# Patient Record
Sex: MALE | Race: WHITE | NOT HISPANIC OR LATINO | Employment: OTHER | URBAN - NONMETROPOLITAN AREA
[De-identification: names, ages, dates, MRNs, and addresses within clinical notes are randomized per-mention and may not be internally consistent; named-entity substitution may affect disease eponyms.]

---

## 2019-01-24 ENCOUNTER — HISTORICAL (OUTPATIENT)
Dept: ADMINISTRATIVE | Facility: HOSPITAL | Age: 79
End: 2019-01-24

## 2020-06-07 ENCOUNTER — HISTORICAL (OUTPATIENT)
Dept: ADMINISTRATIVE | Facility: HOSPITAL | Age: 80
End: 2020-06-07

## 2020-06-08 ENCOUNTER — HISTORICAL (OUTPATIENT)
Dept: ADMINISTRATIVE | Facility: HOSPITAL | Age: 80
End: 2020-06-08

## 2021-02-25 LAB
BASOPHILS # BLD AUTO: 0.03 X10(3)/MCL (ref 0.01–0.08)
BASOPHILS NFR BLD AUTO: 0.5 % (ref 0.1–1.2)
EOSINOPHIL # BLD AUTO: 0.15 X10(3)/MCL (ref 0.04–0.54)
EOSINOPHIL NFR BLD AUTO: 2.3 % (ref 0.7–7)
ERYTHROCYTE [DISTWIDTH] IN BLOOD BY AUTOMATED COUNT: 14.9 % (ref 11.6–14.4)
HCT VFR BLD AUTO: 40.6 % (ref 36–52)
HGB BLD-MCNC: 12.8 G/DL (ref 13–18)
IMM GRANULOCYTES # BLD AUTO: 0.07 X10E3/UL (ref 0–0.03)
IMM GRANULOCYTES NFR BLD AUTO: 1.1 % (ref 0–0.5)
LYMPHOCYTES # BLD AUTO: 0.95 X10(3)/MCL (ref 1.32–3.57)
LYMPHOCYTES NFR BLD AUTO: 14.3 % (ref 20–55)
MCH RBC QN AUTO: 26.6 PG (ref 27–34)
MCHC RBC AUTO-ENTMCNC: 31.5 G/DL (ref 31–37)
MCV RBC AUTO: 84.2 FL (ref 79–99)
MONOCYTES # BLD AUTO: 0.85 X10(3)/MCL (ref 0.3–0.82)
MONOCYTES NFR BLD AUTO: 12.8 % (ref 4.7–12.5)
NEUTROPHILS # BLD AUTO: 4.6 X10(3)/MCL (ref 1.78–5.38)
NEUTROPHILS NFR BLD AUTO: 69 % (ref 37–73)
PLATELET # BLD AUTO: 333 X10(3)/MCL (ref 140–371)
PMV BLD AUTO: 9.7 FL (ref 9.4–12.4)
RBC # BLD AUTO: 4.82 X10(6)/MCL (ref 4–6)
WBC # SPEC AUTO: 6.7 X10(3)/MCL (ref 4–11.5)

## 2021-06-21 LAB
ALBUMIN SERPL-MCNC: 3.8 G/DL (ref 3.4–5)
ALBUMIN/GLOB SERPL: 1.6 {RATIO}
ALP SERPL-CCNC: 98 U/L (ref 50–144)
ALT SERPL-CCNC: 43 U/L (ref 1–45)
ANION GAP SERPL CALC-SCNC: 6 MMOL/L (ref 7–16)
AST SERPL-CCNC: 74 U/L (ref 17–59)
BASOPHILS # BLD AUTO: 0.01 X10(3)/MCL (ref 0.01–0.08)
BASOPHILS NFR BLD AUTO: 0.1 % (ref 0.1–1.2)
BILIRUB SERPL-MCNC: 1.41 MG/DL (ref 0.1–1)
BUN SERPL-MCNC: 17 MG/DL (ref 7–20)
CALCIUM SERPL-MCNC: 9.8 MG/DL (ref 8.4–10.2)
CHLORIDE SERPL-SCNC: 106 MMOL/L (ref 94–110)
CHOLEST SERPL-MCNC: 173 MG/DL (ref 0–200)
CO2 SERPL-SCNC: 27 MMOL/L (ref 21–32)
CREAT SERPL-MCNC: 1 MG/DL (ref 0.66–1.25)
CREAT/UREA NIT SERPL: 17 (ref 12–20)
EOSINOPHIL # BLD AUTO: 0.07 X10(3)/MCL (ref 0.04–0.54)
EOSINOPHIL NFR BLD AUTO: 0.9 % (ref 0.7–7)
ERYTHROCYTE [DISTWIDTH] IN BLOOD BY AUTOMATED COUNT: 14.9 % (ref 11.6–14.4)
EST. AVERAGE GLUCOSE BLD GHB EST-MCNC: 108 MG/DL (ref 70–115)
GLOBULIN SER-MCNC: 2.4 G/DL (ref 2–3.9)
GLUCOSE SERPL-MCNC: 108 MGM./DL (ref 70–115)
HBA1C MFR BLD: 5.6 % (ref 4–6)
HCT VFR BLD AUTO: 42.7 % (ref 36–52)
HDLC SERPL-MCNC: 82 MG/DL (ref 40–60)
HGB BLD-MCNC: 13.4 G/DL (ref 13–18)
IMM GRANULOCYTES # BLD AUTO: 0.02 X10E3/UL (ref 0–0.03)
IMM GRANULOCYTES NFR BLD AUTO: 0.2 % (ref 0–0.5)
LDLC SERPL CALC-MCNC: 54.5 MG/DL (ref 30–100)
LYMPHOCYTES # BLD AUTO: 0.78 X10(3)/MCL (ref 1.32–3.57)
LYMPHOCYTES NFR BLD AUTO: 9.6 % (ref 20–55)
MCH RBC QN AUTO: 26.7 PG (ref 27–34)
MCHC RBC AUTO-ENTMCNC: 31.4 G/DL (ref 31–37)
MCV RBC AUTO: 85.2 FL (ref 79–99)
MONOCYTES # BLD AUTO: 0.67 X10(3)/MCL (ref 0.3–0.82)
MONOCYTES NFR BLD AUTO: 8.2 % (ref 4.7–12.5)
NEUTROPHILS # BLD AUTO: 6.59 X10(3)/MCL (ref 1.78–5.38)
NEUTROPHILS NFR BLD AUTO: 81 % (ref 37–73)
PLATELET # BLD AUTO: 321 X10(3)/MCL (ref 140–371)
PMV BLD AUTO: 9.5 FL (ref 9.4–12.4)
POTASSIUM SERPL-SCNC: 4.8 MMOL/L (ref 3.5–5.1)
PROT SERPL-MCNC: 6.2 G/DL (ref 6.3–8.2)
RBC # BLD AUTO: 5.01 X10(6)/MCL (ref 4–6)
SODIUM SERPL-SCNC: 139 MMOL/L (ref 135–145)
TRIGL SERPL-MCNC: 88 MG/DL (ref 30–200)
TSH SERPL-ACNC: 1.68 UIU/ML (ref 0.36–3.74)
WBC # SPEC AUTO: 8.1 X10(3)/MCL (ref 4–11.5)

## 2021-09-30 LAB
ALBUMIN SERPL-MCNC: 4.1 G/DL (ref 3.4–5)
ALBUMIN/GLOB SERPL: 1.7 {RATIO}
ALP SERPL-CCNC: 83 U/L (ref 50–144)
ALT SERPL-CCNC: 23 U/L (ref 1–45)
ANION GAP SERPL CALC-SCNC: 10 MMOL/L (ref 7–16)
AST SERPL-CCNC: 39 U/L (ref 17–59)
BASOPHILS # BLD AUTO: 0.03 X10(3)/MCL (ref 0.01–0.08)
BASOPHILS NFR BLD AUTO: 0.5 % (ref 0.1–1.2)
BILIRUB SERPL-MCNC: 0.54 MG/DL (ref 0.1–1)
BUN SERPL-MCNC: 20 MG/DL (ref 7–20)
CALCIUM SERPL-MCNC: 9.4 MG/DL (ref 8.4–10.2)
CHLORIDE SERPL-SCNC: 105 MMOL/L (ref 94–110)
CO2 SERPL-SCNC: 25 MMOL/L (ref 21–32)
CREAT SERPL-MCNC: 1.07 MG/DL (ref 0.66–1.25)
CREAT/UREA NIT SERPL: 18.7 (ref 12–20)
EOSINOPHIL # BLD AUTO: 0.29 X10(3)/MCL (ref 0.04–0.54)
EOSINOPHIL NFR BLD AUTO: 5.3 % (ref 0.7–7)
ERYTHROCYTE [DISTWIDTH] IN BLOOD BY AUTOMATED COUNT: 14.7 % (ref 11.6–14.4)
EST. AVERAGE GLUCOSE BLD GHB EST-MCNC: 108 MG/DL (ref 70–115)
GLOBULIN SER-MCNC: 2.4 G/DL (ref 2–3.9)
GLUCOSE SERPL-MCNC: 90 MGM./DL (ref 70–115)
HBA1C MFR BLD: 5.6 % (ref 4–6)
HCT VFR BLD AUTO: 38.6 % (ref 36–52)
HGB BLD-MCNC: 12.8 G/DL (ref 13–18)
IMM GRANULOCYTES # BLD AUTO: 0.03 X10E3/UL (ref 0–0.03)
IMM GRANULOCYTES NFR BLD AUTO: 0.5 % (ref 0–0.5)
LYMPHOCYTES # BLD AUTO: 0.86 X10(3)/MCL (ref 1.32–3.57)
LYMPHOCYTES NFR BLD AUTO: 15.7 % (ref 20–55)
MCH RBC QN AUTO: 27.8 PG (ref 27–34)
MCHC RBC AUTO-ENTMCNC: 33.2 G/DL (ref 31–37)
MCV RBC AUTO: 83.7 FL (ref 79–99)
MONOCYTES # BLD AUTO: 0.46 X10(3)/MCL (ref 0.3–0.82)
MONOCYTES NFR BLD AUTO: 8.4 % (ref 4.7–12.5)
NEUTROPHILS # BLD AUTO: 3.81 X10(3)/MCL (ref 1.78–5.38)
NEUTROPHILS NFR BLD AUTO: 69.6 % (ref 37–73)
PLATELET # BLD AUTO: 314 X10(3)/MCL (ref 140–371)
PMV BLD AUTO: 9.4 FL (ref 9.4–12.4)
POTASSIUM SERPL-SCNC: 4.6 MMOL/L (ref 3.5–5.1)
PROT SERPL-MCNC: 6.5 G/DL (ref 6.3–8.2)
RBC # BLD AUTO: 4.61 X10(6)/MCL (ref 4–6)
SODIUM SERPL-SCNC: 140 MMOL/L (ref 135–145)
WBC # SPEC AUTO: 5.5 X10(3)/MCL (ref 4–11.5)

## 2022-04-05 LAB
AGE: 81
ALBUMIN SERPL-MCNC: 4.1 G/DL (ref 3.4–5)
ALBUMIN/GLOB SERPL: 1.6 {RATIO}
ALP SERPL-CCNC: 87 U/L (ref 50–144)
ALT SERPL-CCNC: 15 U/L (ref 1–45)
ANION GAP SERPL CALC-SCNC: 7 MMOL/L (ref 2–13)
AST SERPL-CCNC: 35 U/L (ref 17–59)
BASOPHILS # BLD AUTO: 0.03 10*3/UL (ref 0.01–0.08)
BASOPHILS NFR BLD AUTO: 0.5 % (ref 0.1–1.2)
BILIRUB SERPL-MCNC: 0.7 MG/DL (ref 0–1)
BUN SERPL-MCNC: 26 MG/DL (ref 7–20)
CALCIUM SERPL-MCNC: 8.9 MG/DL (ref 8.4–10.2)
CHLORIDE SERPL-SCNC: 105 MMOL/L (ref 94–110)
CHOLEST SERPL-MCNC: 176 MG/DL (ref 0–200)
CO2 SERPL-SCNC: 28 MMOL/L (ref 21–32)
CREAT SERPL-MCNC: 1.04 MG/DL (ref 0.66–1.25)
CREAT/UREA NIT SERPL: 25 (ref 12–20)
EOSINOPHIL # BLD AUTO: 0.17 10*3/UL (ref 0.04–0.54)
EOSINOPHIL NFR BLD AUTO: 2.8 % (ref 0.7–7)
ERYTHROCYTE [DISTWIDTH] IN BLOOD BY AUTOMATED COUNT: 14.2 % (ref 11.6–14.4)
EST. AVERAGE GLUCOSE BLD GHB EST-MCNC: 102 MG/DL (ref 70–115)
GLOBULIN SER-MCNC: 2.5 G/DL (ref 2–3.9)
GLUCOSE SERPL-MCNC: 100 MG/DL (ref 70–115)
HBA1C MFR BLD: 5.4 % (ref 4–6)
HCT VFR BLD AUTO: 39.5 % (ref 36–52)
HDLC SERPL-MCNC: 69 MG/DL (ref 40–60)
HGB BLD-MCNC: 12.8 G/DL (ref 13–18)
IMM GRANULOCYTES # BLD AUTO: 0.03 10*3/UL (ref 0–0.03)
IMM GRANULOCYTES NFR BLD AUTO: 0.5 % (ref 0–0.5)
LDLC SERPL CALC-MCNC: 62.7 MG/DL (ref 30–100)
LYMPHOCYTES # BLD AUTO: 1.2 10*3/UL (ref 1.32–3.57)
LYMPHOCYTES NFR BLD AUTO: 19.9 % (ref 20–55)
MANUAL DIFF? (OHS): NORMAL
MCH RBC QN AUTO: 27.6 PG (ref 27–34)
MCHC RBC AUTO-ENTMCNC: 32.4 G/DL (ref 31–37)
MCV RBC AUTO: 85.1 FL (ref 79–99)
MONOCYTES # BLD AUTO: 0.54 10*3/UL (ref 0.3–0.82)
MONOCYTES NFR BLD AUTO: 8.9 % (ref 4.7–12.5)
NEUTROPHILS # BLD AUTO: 4.07 10*3/UL (ref 1.78–5.38)
NEUTROPHILS NFR BLD AUTO: 67.4 % (ref 37–73)
PLATELET # BLD AUTO: 331 10*3/UL (ref 140–371)
PMV BLD AUTO: 9.9 FL (ref 9.4–12.4)
POTASSIUM SERPL-SCNC: 4.5 MMOL/L (ref 3.5–5.1)
PROT SERPL-MCNC: 6.6 G/DL (ref 6.3–8.2)
RBC # BLD AUTO: 4.64 10*6/UL (ref 4–6)
SODIUM SERPL-SCNC: 140 MMOL/L (ref 135–145)
TRIGL SERPL-MCNC: 120 MG/DL (ref 30–200)
TSH SERPL-ACNC: 2.47 M[IU]/L (ref 0.36–3.74)
WBC # SPEC AUTO: 6 10*3/UL (ref 4–11.5)

## 2022-04-10 ENCOUNTER — HISTORICAL (OUTPATIENT)
Dept: ADMINISTRATIVE | Facility: HOSPITAL | Age: 82
End: 2022-04-10

## 2022-04-27 VITALS
HEIGHT: 71 IN | BODY MASS INDEX: 20.55 KG/M2 | SYSTOLIC BLOOD PRESSURE: 118 MMHG | DIASTOLIC BLOOD PRESSURE: 62 MMHG | OXYGEN SATURATION: 97 % | WEIGHT: 146.81 LBS

## 2022-04-30 ENCOUNTER — HISTORICAL (OUTPATIENT)
Dept: ADMINISTRATIVE | Facility: HOSPITAL | Age: 82
End: 2022-04-30

## 2022-05-03 NOTE — HISTORICAL [facility]ERNER
This is a historical note converted from Pee. Formatting and pictures may have been removed.  Please reference Pee for original formatting and attached multimedia. History of Present Illness  80-year-old male?asked to be seen today because of some chest pain. ?He has a history of known coronary disease. ?He has been feeling heaviness on his chest?especially when he exerts himself. ?He did take some Nitrostat and it does seem to help.? He had a little bit of increased dyspnea on exertion as well.  Review of Systems  See HPI  Physical Exam  Heart with a regular rate and rhythm  Assessment/Plan  1.?CAD in native artery ? I25.10  ?He has no unstable angina so I want him to continue his Nitrostat as needed. ?We will check a CBC today to make sure that he is not having any anemia?as a trigger for this as he had been stable for the last 6 months.? He does have an appoint with his cardiologist?next week and I told him to keep this and let them know.  Orders:  Office/Outpatient Visit Level 3 Established 26017 , CAD in native artery, FREDERICK Pardo [facility], 02/25/21 17:13:00 CST   Problem List/Past Medical History  Ongoing  CAD in native artery  Depression  Disc disease, degenerative, cervical  History of pancreatic cancer  HTN (hypertension)  Hyperlipidemia  IFG (impaired fasting glucose)  Lumbar spinal stenosis  Medicare annual wellness visit, subsequent  Protein deficiency  Right supraspinatus tendinitis  Shoulder bursitis  Historical  No qualifying data  Procedure/Surgical History  Colonoscopy (07/15/2020)  EGD - Esophagogastroduodenoscopy (07/15/2020)   Medications  aspirin 81 mg oral Delayed Release (EC) tablet, 81 mg= 1 tab(s), Oral, Daily  atorvastatin 40 mg oral tablet, See Instructions  ClearLax oral powder for reconstitution, 17 gm, Oral, Daily  clopidogrel 75 mg oral tablet, 75 mg= 1 tab(s), Oral, Daily  Creon 12,000 units oral delayed release capsule, 1 cap(s), Oral, TID  fenofibrate 134 mg oral  capsule, See Instructions  isosorbide MONOnitrate 30 mg oral tablet, Extended Release, See Instructions  metoprolol succinate 100 mg oral capsule, extended release, 100 mg= 1 cap(s), Oral, Daily  miSOPROStol 100 mcg oral tablet, 100 mcg= 1 tab(s), Oral, BID  nitroglycerin 0.4 mg sublingual spray, SL, q5min  Pantoprazole 40 mg ORAL EC-Tablet, 40 mg= 1 tab(s), Oral, Daily  Tylenol, Oral  venlafaxine 150 mg oral capsule, extended release, See Instructions  Vitamin D 50,000 intl units oral capsule, 45485 IntUnit= 1 cap(s), Oral, 2x/Wk, 1 refills  zolpidem 10 mg oral tablet, 10 mg= 1 tab(s), Oral, qPM, 5 refills  Allergies  nitroglycerin?(Migraine)  Social History  Abuse/Neglect  No, 03/04/2020  Tobacco  Never (less than 100 in lifetime), No, 03/04/2020  Family History  Family history is negative  Immunizations  Vaccine Date Status   COVID-19 MRNA, LNP-S, PF- Pfizer 02/05/2021 Given   COVID-19 MRNA, LNP-S, PF- Pfizer 01/15/2021 Given   Health Maintenance  Health Maintenance  ???Pending?(in the next year)  ??? ??OverDue  ??? ? ? ?Influenza Vaccine due??10/01/20??and every 1??day(s)  ??? ? ? ?Advance Directive due??01/02/21??and every 1??year(s)  ??? ? ? ?Cognitive Screening due??01/02/21??and every 1??year(s)  ??? ? ? ?Fall Risk Assessment due??01/02/21??and every 1??year(s)  ??? ? ? ?Functional Assessment due??01/02/21??and every 1??year(s)  ??? ??Due?  ??? ? ? ?Obesity Screening due??01/01/21??and every 1??year(s)  ??? ? ? ?ADL Screening due??02/25/21??and every 1??year(s)  ??? ? ? ?Hypertension Management-Education due??02/25/21??and every 1??year(s)  ??? ? ? ?Pneumococcal Vaccine due??02/25/21??Unknown Frequency  ??? ? ? ?Tetanus Vaccine due??02/25/21??and every 10??year(s)  ??? ? ? ?Zoster Vaccine due??02/25/21??Unknown Frequency  ??? ??Due In Future?  ??? ? ? ?Medicare Annual Wellness Exam not due until??09/03/21??and every 1??year(s)  ??? ? ? ?Hypertension Management-BMP not due until??12/08/21??and every  1??year(s)  ??? ? ? ?Blood Pressure Screening not due until??12/15/21??and every 1??year(s)  ??? ? ? ?Body Mass Index Check not due until??12/15/21??and every 1??year(s)  ??? ? ? ?Hypertension Management-Blood Pressure not due until??12/15/21??and every 1??year(s)  ???Satisfied?(in the past 1 year)  ??? ??Satisfied?  ??? ? ? ?Advance Directive on??09/03/20.??Satisfied by Disha Fritz  ??? ? ? ?Blood Pressure Screening on??12/15/20.??Satisfied by Dee Carbone  ??? ? ? ?Body Mass Index Check on??12/15/20.??Satisfied by Dee Carbone  ??? ? ? ?Cognitive Screening on??09/03/20.??Satisfied by Disha Fritz  ??? ? ? ?Coronary Artery Disease Maintenance-Lipid Lowering Therapy on??02/23/21.??Satisfied by Toni Grigsby MD  ??? ? ? ?Depression Screening on??09/03/20.??Satisfied by Disha Fritz  ??? ? ? ?Fall Risk Assessment on??09/03/20.??Satisfied by Disha Fritz  ??? ? ? ?Functional Assessment on??09/03/20.??Satisfied by Disha Fritz  ??? ? ? ?Hypertension Management-Blood Pressure on??12/15/20.??Satisfied by Dee Carbone  ??? ? ? ?Medicare Annual Wellness Exam on??09/03/20.??Satisfied by Toni Grigsby MD  ??? ? ? ?Obesity Screening on??12/15/20.??Satisfied by Dee Carbone  ?

## 2023-02-09 DIAGNOSIS — G47.00 INSOMNIA, UNSPECIFIED TYPE: Primary | ICD-10-CM

## 2023-02-09 RX ORDER — ZOLPIDEM TARTRATE 10 MG/1
TABLET ORAL
COMMUNITY
Start: 2022-11-11 | End: 2023-02-09 | Stop reason: SDUPTHER

## 2023-02-09 RX ORDER — ZOLPIDEM TARTRATE 10 MG/1
10 TABLET ORAL NIGHTLY
Qty: 30 TABLET | Refills: 0 | Status: SHIPPED | OUTPATIENT
Start: 2023-02-09 | End: 2023-03-15 | Stop reason: SDUPTHER

## 2023-02-09 NOTE — TELEPHONE ENCOUNTER
----- Message from Jeniffer Webber sent at 2/9/2023  9:45 AM CST -----  Regarding: Refill  Pt called requesting a refill for his  zolpidem 10 mg oral tablet to be sent to the [address] Phone [phone]

## 2023-03-15 DIAGNOSIS — G47.00 INSOMNIA, UNSPECIFIED TYPE: ICD-10-CM

## 2023-03-16 RX ORDER — ZOLPIDEM TARTRATE 10 MG/1
10 TABLET ORAL NIGHTLY
Qty: 90 TABLET | Refills: 0 | Status: SHIPPED | OUTPATIENT
Start: 2023-03-16 | End: 2023-06-13

## 2023-04-12 PROCEDURE — 83036 HEMOGLOBIN GLYCOSYLATED A1C: CPT | Performed by: FAMILY MEDICINE

## 2023-04-12 PROCEDURE — 80061 LIPID PANEL: CPT | Performed by: FAMILY MEDICINE

## 2023-04-12 PROCEDURE — 85025 COMPLETE CBC W/AUTO DIFF WBC: CPT | Performed by: FAMILY MEDICINE

## 2023-04-12 PROCEDURE — 80053 COMPREHEN METABOLIC PANEL: CPT | Performed by: FAMILY MEDICINE

## 2023-04-19 ENCOUNTER — OFFICE VISIT (OUTPATIENT)
Dept: FAMILY MEDICINE | Facility: CLINIC | Age: 83
End: 2023-04-19
Payer: MEDICARE

## 2023-04-19 VITALS
OXYGEN SATURATION: 100 % | WEIGHT: 155 LBS | HEIGHT: 71 IN | BODY MASS INDEX: 21.7 KG/M2 | HEART RATE: 62 BPM | DIASTOLIC BLOOD PRESSURE: 70 MMHG | SYSTOLIC BLOOD PRESSURE: 132 MMHG

## 2023-04-19 DIAGNOSIS — Z90.410 HISTORY OF WHIPPLE PROCEDURE: ICD-10-CM

## 2023-04-19 DIAGNOSIS — I10 PRIMARY HYPERTENSION: ICD-10-CM

## 2023-04-19 DIAGNOSIS — M48.061 SPINAL STENOSIS OF LUMBAR REGION WITHOUT NEUROGENIC CLAUDICATION: ICD-10-CM

## 2023-04-19 DIAGNOSIS — I25.10 CORONARY ARTERY DISEASE INVOLVING NATIVE CORONARY ARTERY OF NATIVE HEART, UNSPECIFIED WHETHER ANGINA PRESENT: ICD-10-CM

## 2023-04-19 DIAGNOSIS — Z90.49 HISTORY OF WHIPPLE PROCEDURE: ICD-10-CM

## 2023-04-19 DIAGNOSIS — E78.5 HYPERLIPIDEMIA, UNSPECIFIED HYPERLIPIDEMIA TYPE: ICD-10-CM

## 2023-04-19 DIAGNOSIS — E46: ICD-10-CM

## 2023-04-19 DIAGNOSIS — R74.8 ELEVATED ALKALINE PHOSPHATASE LEVEL: Primary | ICD-10-CM

## 2023-04-19 DIAGNOSIS — R73.01 IMPAIRED FASTING GLUCOSE: ICD-10-CM

## 2023-04-19 DIAGNOSIS — M50.30 DEGENERATION OF INTERVERTEBRAL DISC OF CERVICAL REGION: ICD-10-CM

## 2023-04-19 DIAGNOSIS — F32.A DEPRESSIVE DISORDER: ICD-10-CM

## 2023-04-19 PROCEDURE — 3075F SYST BP GE 130 - 139MM HG: CPT | Mod: ,,, | Performed by: FAMILY MEDICINE

## 2023-04-19 PROCEDURE — 1159F MED LIST DOCD IN RCRD: CPT | Mod: ,,, | Performed by: FAMILY MEDICINE

## 2023-04-19 PROCEDURE — 99214 PR OFFICE/OUTPT VISIT, EST, LEVL IV, 30-39 MIN: ICD-10-PCS | Mod: ,,, | Performed by: FAMILY MEDICINE

## 2023-04-19 PROCEDURE — 99214 OFFICE O/P EST MOD 30 MIN: CPT | Mod: ,,, | Performed by: FAMILY MEDICINE

## 2023-04-19 PROCEDURE — 3075F PR MOST RECENT SYSTOLIC BLOOD PRESS GE 130-139MM HG: ICD-10-PCS | Mod: ,,, | Performed by: FAMILY MEDICINE

## 2023-04-19 PROCEDURE — 3078F PR MOST RECENT DIASTOLIC BLOOD PRESSURE < 80 MM HG: ICD-10-PCS | Mod: ,,, | Performed by: FAMILY MEDICINE

## 2023-04-19 PROCEDURE — 1159F PR MEDICATION LIST DOCUMENTED IN MEDICAL RECORD: ICD-10-PCS | Mod: ,,, | Performed by: FAMILY MEDICINE

## 2023-04-19 PROCEDURE — 3078F DIAST BP <80 MM HG: CPT | Mod: ,,, | Performed by: FAMILY MEDICINE

## 2023-04-19 RX ORDER — FLECAINIDE ACETATE 50 MG/1
1 TABLET ORAL DAILY
COMMUNITY
Start: 2023-03-06

## 2023-04-19 RX ORDER — METOPROLOL SUCCINATE 50 MG/1
1 TABLET, EXTENDED RELEASE ORAL DAILY
COMMUNITY
Start: 2023-04-14

## 2023-04-19 RX ORDER — METHOCARBAMOL 750 MG/1
750 TABLET, FILM COATED ORAL
COMMUNITY
End: 2023-05-31

## 2023-04-19 RX ORDER — VENLAFAXINE HYDROCHLORIDE 150 MG/1
1 CAPSULE, EXTENDED RELEASE ORAL DAILY
COMMUNITY
Start: 2023-04-14 | End: 2023-06-13

## 2023-04-19 RX ORDER — AMLODIPINE BESYLATE 5 MG/1
1 TABLET ORAL DAILY
COMMUNITY
Start: 2023-04-14

## 2023-04-19 RX ORDER — ATORVASTATIN CALCIUM 40 MG/1
1 TABLET, FILM COATED ORAL DAILY
COMMUNITY
Start: 2023-02-27 | End: 2023-05-24

## 2023-04-19 RX ORDER — NAPROXEN SODIUM 220 MG/1
81 TABLET, FILM COATED ORAL DAILY
COMMUNITY

## 2023-04-19 RX ORDER — ERGOCALCIFEROL 1.25 MG/1
50000 CAPSULE ORAL
COMMUNITY
Start: 2023-04-14

## 2023-04-19 RX ORDER — PANCRELIPASE 60000; 12000; 38000 [USP'U]/1; [USP'U]/1; [USP'U]/1
3 CAPSULE, DELAYED RELEASE PELLETS ORAL 3 TIMES DAILY
COMMUNITY
Start: 2022-10-19 | End: 2023-05-31

## 2023-04-19 RX ORDER — NITROGLYCERIN 400 UG/1
SPRAY ORAL
COMMUNITY
Start: 2023-01-31

## 2023-04-19 RX ORDER — PANTOPRAZOLE SODIUM 40 MG/1
1 TABLET, DELAYED RELEASE ORAL DAILY
COMMUNITY
Start: 2023-02-27 | End: 2023-11-01

## 2023-04-19 NOTE — PROGRESS NOTES
SUBJECTIVE:  Alissa Najera is a 82 y.o. male here for Follow-up (Lab results)      HPI  Here for follow-up on chronic medical conditions.  See assessment plan for individual list of issues.  He has been doing pretty well other than a few symptoms.  He has been having some early satiety in the evening and not eating as much.  He has not had any weight loss.  He has been having some low back pain into his hips when he walks.  Augustuss allergies, medications, history, and problem list were updated as appropriate.    Review of Systems   See HPI.    Recent Results (from the past 504 hour(s))   Comprehensive Metabolic Panel    Collection Time: 04/12/23  9:24 AM   Result Value Ref Range    Sodium Level 141 135 - 145 mmol/L    Potassium Level 4.9 3.5 - 5.1 mmol/L    Chloride 107 98 - 110 mmol/L    Carbon Dioxide 29 21 - 32 mmol/L    Glucose Level 110 70 - 115 mg/dL    Blood Urea Nitrogen 20.0 7.0 - 20.0 mg/dL    Creatinine 0.77 0.66 - 1.25 mg/dL    Calcium Level Total 9.4 8.4 - 10.2 mg/dL    Protein Total 6.7 6.3 - 8.2 gm/dL    Albumin Level 4.2 3.4 - 5.0 g/dL    Globulin 2.5 2.0 - 3.9 gm/dL    Albumin/Globulin Ratio 1.7 ratio    Bilirubin Total 0.6 0.0 - 1.0 mg/dL    Alkaline Phosphatase 162 (H) 50 - 144 unit/L    Alanine Aminotransferase 42 1 - 45 unit/L    Aspartate Aminotransferase 46 17 - 59 unit/L    eGFR 89 mls/min/1.73/m2    Anion Gap 5.0 2.0 - 13.0 mEq/L    BUN/Creatinine Ratio 26 (H) 12 - 20   Hemoglobin A1C    Collection Time: 04/12/23  9:24 AM   Result Value Ref Range    Hemoglobin A1c 5.8 4.0 - 6.0 %    Estimated Average Glucose 119.8 (H) 70.0 - 115.0 mg/dL   Lipid Panel    Collection Time: 04/12/23  9:24 AM   Result Value Ref Range    Cholesterol Total 170 0 - 200 mg/dL    HDL Cholesterol 54 40 - 60 mg/dL    Triglyceride 187 30 - 200 mg/dL    LDL Cholesterol Direct 73.9 30.0 - 100.0 mg/dL   CBC with Differential    Collection Time: 04/12/23  9:24 AM   Result Value Ref Range    WBC 6.0 4.0 - 11.5 x10(3)/mcL  "   RBC 5.11 4.00 - 6.00 x10(6)/mcL    Hgb 14.2 13.0 - 18.0 g/dL    Hct 42.8 36.0 - 52.0 %    MCV 83.8 79.0 - 99.0 fL    MCH 27.8 27.0 - 34.0 pg    MCHC 33.2 31.0 - 37.0 g/dL    RDW 13.8 11.6 - 14.4 %    Platelet 264 140 - 371 x10(3)/mcL    MPV 9.7 9.4 - 12.4 fL    Neut % 73.3 (H) 37 - 73 %    Lymph % 16.3 (L) 20 - 55 %    Mono % 7.9 4.7 - 12.5 %    Eos % 1.5 0.7 - 7 %    Basophil % 0.5 0.1 - 1.2 %    Lymph # 0.97 (L) 1.32 - 3.57 x10(3)/mcL    Neut # 4.37 1.78 - 5.38 x10(3)/mcL    Mono # 0.47 0.3 - 0.82 x10(3)/mcL    Eos # 0.09 0.04 - 0.54 x10(3)/mcL    Baso # 0.03 0.01 - 0.08 x10(3)/mcL    IG# 0.03 0.0001 - 0.031 x10(3)/mcL    IG% 0.5 0 - 0.5 %    NRBC% 0.0 0 - 1 %       OBJECTIVE:  Vital signs  Vitals:    04/19/23 1120 04/19/23 1124   BP: (!) 146/68 132/70   BP Location: Right arm Right arm   Patient Position: Sitting Sitting   Pulse: 62    SpO2: 100%    Weight: 70.3 kg (155 lb)    Height: 5' 10.87" (1.8 m)         Physical Exam abdomen has some tenderness in the epigastric area but no masses noted    ASSESSMENT/PLAN:  1. Spinal stenosis of lumbar region without neurogenic claudication  I believe this is what is causing his low back and pain in the his sacral area.    2. Degeneration of intervertebral disc of cervical region      3. Depressive disorder  Continue venlafaxine    4. Primary hypertension  Blood pressure well controlled on metoprolol and amlodipine    5. Hyperlipidemia, unspecified hyperlipidemia type  On atorvastatin 40 mg daily.  LDL is 73    6. Impaired fasting glucose  Glucose is 110, A1c 5.8    7. Protein deficiency disease  Protein this time was 6.7.  He is still taking protein drinks 3 times a week and eating cottage cheese to help with routine    8. History of Whipple procedure  He is on pancreatic enzymes    9. Coronary artery disease involving native coronary artery of native heart, unspecified whether angina present  No current angina    10. Elevated alkaline phosphatase level  Alkaline " phosphatase is 162.  This is an increase in the 1st time it has been elevated in the few years I have been seeing him.  I am concerned with his early satiety and epigastric pain something could be going on with the biliary tract with his history of pancreatic cancer.  His bilirubin level is normal this AST and ALT her little bit more elevated than they have been the past few years.  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

## 2023-05-24 DIAGNOSIS — E78.5 HYPERLIPIDEMIA, UNSPECIFIED HYPERLIPIDEMIA TYPE: Primary | ICD-10-CM

## 2023-05-24 PROCEDURE — 84075 ASSAY ALKALINE PHOSPHATASE: CPT | Mod: 90,91 | Performed by: FAMILY MEDICINE

## 2023-05-24 PROCEDURE — 80053 COMPREHEN METABOLIC PANEL: CPT | Performed by: FAMILY MEDICINE

## 2023-05-24 RX ORDER — ATORVASTATIN CALCIUM 40 MG/1
TABLET, FILM COATED ORAL
Qty: 90 TABLET | Refills: 1 | Status: SHIPPED | OUTPATIENT
Start: 2023-05-24 | End: 2023-11-27

## 2023-05-31 ENCOUNTER — OFFICE VISIT (OUTPATIENT)
Dept: FAMILY MEDICINE | Facility: CLINIC | Age: 83
End: 2023-05-31
Payer: MEDICARE

## 2023-05-31 VITALS
HEIGHT: 71 IN | DIASTOLIC BLOOD PRESSURE: 58 MMHG | HEART RATE: 66 BPM | SYSTOLIC BLOOD PRESSURE: 108 MMHG | WEIGHT: 154 LBS | BODY MASS INDEX: 21.56 KG/M2 | OXYGEN SATURATION: 97 %

## 2023-05-31 DIAGNOSIS — M48.061 SPINAL STENOSIS OF LUMBAR REGION WITHOUT NEUROGENIC CLAUDICATION: ICD-10-CM

## 2023-05-31 DIAGNOSIS — Z90.410 HISTORY OF WHIPPLE PROCEDURE: ICD-10-CM

## 2023-05-31 DIAGNOSIS — R74.8 ELEVATED ALKALINE PHOSPHATASE LEVEL: Primary | ICD-10-CM

## 2023-05-31 DIAGNOSIS — I25.10 CORONARY ARTERY DISEASE INVOLVING NATIVE CORONARY ARTERY OF NATIVE HEART, UNSPECIFIED WHETHER ANGINA PRESENT: ICD-10-CM

## 2023-05-31 DIAGNOSIS — Z90.49 HISTORY OF WHIPPLE PROCEDURE: ICD-10-CM

## 2023-05-31 PROCEDURE — 1159F PR MEDICATION LIST DOCUMENTED IN MEDICAL RECORD: ICD-10-PCS | Mod: ,,, | Performed by: FAMILY MEDICINE

## 2023-05-31 PROCEDURE — 3074F PR MOST RECENT SYSTOLIC BLOOD PRESSURE < 130 MM HG: ICD-10-PCS | Mod: ,,, | Performed by: FAMILY MEDICINE

## 2023-05-31 PROCEDURE — 3074F SYST BP LT 130 MM HG: CPT | Mod: ,,, | Performed by: FAMILY MEDICINE

## 2023-05-31 PROCEDURE — 3288F FALL RISK ASSESSMENT DOCD: CPT | Mod: ,,, | Performed by: FAMILY MEDICINE

## 2023-05-31 PROCEDURE — 1101F PT FALLS ASSESS-DOCD LE1/YR: CPT | Mod: ,,, | Performed by: FAMILY MEDICINE

## 2023-05-31 PROCEDURE — 1101F PR PT FALLS ASSESS DOC 0-1 FALLS W/OUT INJ PAST YR: ICD-10-PCS | Mod: ,,, | Performed by: FAMILY MEDICINE

## 2023-05-31 PROCEDURE — 3078F PR MOST RECENT DIASTOLIC BLOOD PRESSURE < 80 MM HG: ICD-10-PCS | Mod: ,,, | Performed by: FAMILY MEDICINE

## 2023-05-31 PROCEDURE — 99214 OFFICE O/P EST MOD 30 MIN: CPT | Mod: ,,, | Performed by: FAMILY MEDICINE

## 2023-05-31 PROCEDURE — 3078F DIAST BP <80 MM HG: CPT | Mod: ,,, | Performed by: FAMILY MEDICINE

## 2023-05-31 PROCEDURE — 99214 PR OFFICE/OUTPT VISIT, EST, LEVL IV, 30-39 MIN: ICD-10-PCS | Mod: ,,, | Performed by: FAMILY MEDICINE

## 2023-05-31 PROCEDURE — 1159F MED LIST DOCD IN RCRD: CPT | Mod: ,,, | Performed by: FAMILY MEDICINE

## 2023-05-31 PROCEDURE — 3288F PR FALLS RISK ASSESSMENT DOCUMENTED: ICD-10-PCS | Mod: ,,, | Performed by: FAMILY MEDICINE

## 2023-05-31 RX ORDER — FAMOTIDINE 10 MG/1
10 TABLET ORAL
COMMUNITY

## 2023-05-31 RX ORDER — TIZANIDINE 4 MG/1
4 TABLET ORAL 2 TIMES DAILY PRN
Qty: 60 TABLET | Refills: 1 | Status: SHIPPED | OUTPATIENT
Start: 2023-05-31 | End: 2023-08-29

## 2023-05-31 RX ORDER — PANCRELIPASE 60000; 12000; 38000 [USP'U]/1; [USP'U]/1; [USP'U]/1
3 CAPSULE, DELAYED RELEASE PELLETS ORAL 4 TIMES DAILY
Qty: 1080 CAPSULE | Refills: 3 | Status: SHIPPED | OUTPATIENT
Start: 2023-05-31 | End: 2024-05-30

## 2023-05-31 NOTE — PROGRESS NOTES
SUBJECTIVE:  Alissa Najera is a 82 y.o. male here for Follow-up (Labs)      HPI  Patient here for follow-up.  Since last visit he did see his surgeon from [facility].  They did an MRI of his abdomen.  He did have some small cyst in the pancreas but nothing that was concerning in appearance.  His alkaline phosphatase still a little elevated but down from 6 weeks ago.  Sixty-four down to 155.  Isoenzymes showed that is predominantly intestinal and liver.  He has been having some increase weakness and shortness of breath with exertion but no angina.  Augustuss allergies, medications, history, and problem list were updated as appropriate.    Review of Systems   A comprehensive review of symptoms was completed and negative except as noted above.    Recent Results (from the past 504 hour(s))   Comprehensive Metabolic Panel    Collection Time: 05/24/23  9:11 AM   Result Value Ref Range    Sodium Level 143 135 - 145 mmol/L    Potassium Level 4.5 3.5 - 5.1 mmol/L    Chloride 107 98 - 110 mmol/L    Carbon Dioxide 29 21 - 32 mmol/L    Glucose Level 118 (H) 70 - 115 mg/dL    Blood Urea Nitrogen 22.0 (H) 7.0 - 20.0 mg/dL    Creatinine 0.77 0.66 - 1.25 mg/dL    Calcium Level Total 9.2 8.4 - 10.2 mg/dL    Protein Total 6.8 6.3 - 8.2 gm/dL    Albumin Level 4.5 3.4 - 5.0 g/dL    Globulin 2.3 2.0 - 3.9 gm/dL    Albumin/Globulin Ratio 2.0 ratio    Bilirubin Total 0.6 0.0 - 1.0 mg/dL    Alkaline Phosphatase 155 (H) 50 - 144 unit/L    Alanine Aminotransferase 48 (H) 1 - 45 unit/L    Aspartate Aminotransferase 48 17 - 59 unit/L    eGFR 89 mls/min/1.73/m2    Anion Gap 7.0 2.0 - 13.0 mEq/L    BUN/Creatinine Ratio 29 (H) 12 - 20   Alkaline Phosphatase, Isoenzymes    Collection Time: 05/24/23  9:11 AM   Result Value Ref Range    Alkaline Phosphatase, S 150 (H) 40 - 129 U/L    Liver 1 % 50.3 27.8 - 76.3 %    Liver 1 75.5 (H) 16.2 - 70.2 IU/L    Liver 2 % 10.4 (H) 0.0 - 8.0 %    Liver 2 15.6 (H) 0.0 - 5.8 IU/L    Bone % 21.7 19.1 - 67.7 %  "   Bone 32.6 12.1 - 42.7 IU/L    Intestine % 17.6 0.0 - 20.6 %    Intestine 26.4 (H) 0.0 - 11.0 IU/L    Placental Not Present Not present       OBJECTIVE:  Vital signs  Vitals:    05/31/23 1109   BP: (!) 108/58   BP Location: Right arm   Patient Position: Sitting   Pulse: 66   SpO2: 97%   Weight: 69.9 kg (154 lb)   Height: 5' 10.87" (1.8 m)        Physical Exam heart    ASSESSMENT/PLAN:  1. Elevated alkaline phosphatase level  I think we will monitor this for now.  We will repeat levels in 3 months.    2. History of Whipple procedure      3. Spinal stenosis of lumbar region without neurogenic claudication  Recently had some flare-up of his back with severe pain and had trouble moving around for several days.  He took some tizanidine which really helped and would like to have some more to have on hand for flare-ups.  He is some stretches he also does not home  4. Pancreatic insufficiency - refilled pancreatic enzymes    5. Coronary artery disease - I think a lot of his fatigue with exertion is from heart disease.  He is not having angina at rest and I told him just push himself as much as he can and then rests when he gets tired    Other orders  -     tiZANidine (ZANAFLEX) 4 MG tablet; Take 1 tablet (4 mg total) by mouth 2 (two) times daily as needed.  Dispense: 60 tablet; Refill: 1  -     lipase-protease-amylase 12,000-38,000-60,000 units (CREON) CpDR; Take 3 capsules by mouth 4 (four) times daily.  Dispense: 1080 capsule; Refill: 3         Follow Up:  Follow up in about 3 months (around 8/31/2023) for recheck, nonfasting labs.            "

## 2023-06-13 DIAGNOSIS — G47.00 INSOMNIA, UNSPECIFIED TYPE: ICD-10-CM

## 2023-06-13 DIAGNOSIS — F32.A DEPRESSIVE DISORDER: Primary | ICD-10-CM

## 2023-06-13 RX ORDER — VENLAFAXINE HYDROCHLORIDE 150 MG/1
CAPSULE, EXTENDED RELEASE ORAL
Qty: 90 CAPSULE | Refills: 3 | Status: SHIPPED | OUTPATIENT
Start: 2023-06-13

## 2023-06-13 RX ORDER — ZOLPIDEM TARTRATE 10 MG/1
TABLET ORAL
Qty: 90 TABLET | Refills: 0 | Status: SHIPPED | OUTPATIENT
Start: 2023-06-13 | End: 2023-09-07 | Stop reason: SDUPTHER

## 2023-08-31 PROCEDURE — 80053 COMPREHEN METABOLIC PANEL: CPT | Performed by: FAMILY MEDICINE

## 2023-08-31 PROCEDURE — 85025 COMPLETE CBC W/AUTO DIFF WBC: CPT | Performed by: FAMILY MEDICINE

## 2023-09-07 ENCOUNTER — OFFICE VISIT (OUTPATIENT)
Dept: FAMILY MEDICINE | Facility: CLINIC | Age: 83
End: 2023-09-07
Payer: MEDICARE

## 2023-09-07 VITALS
OXYGEN SATURATION: 98 % | HEART RATE: 73 BPM | WEIGHT: 159 LBS | TEMPERATURE: 96 F | BODY MASS INDEX: 22.26 KG/M2 | SYSTOLIC BLOOD PRESSURE: 130 MMHG | DIASTOLIC BLOOD PRESSURE: 74 MMHG | HEIGHT: 71 IN

## 2023-09-07 DIAGNOSIS — I10 PRIMARY HYPERTENSION: ICD-10-CM

## 2023-09-07 DIAGNOSIS — E46: ICD-10-CM

## 2023-09-07 DIAGNOSIS — G47.00 INSOMNIA, UNSPECIFIED TYPE: ICD-10-CM

## 2023-09-07 DIAGNOSIS — R79.9 ABNORMAL FINDING OF BLOOD CHEMISTRY, UNSPECIFIED: ICD-10-CM

## 2023-09-07 DIAGNOSIS — R74.8 ELEVATED ALKALINE PHOSPHATASE LEVEL: Primary | ICD-10-CM

## 2023-09-07 DIAGNOSIS — I25.10 CORONARY ARTERY DISEASE INVOLVING NATIVE CORONARY ARTERY OF NATIVE HEART, UNSPECIFIED WHETHER ANGINA PRESENT: ICD-10-CM

## 2023-09-07 DIAGNOSIS — M48.061 SPINAL STENOSIS OF LUMBAR REGION WITHOUT NEUROGENIC CLAUDICATION: ICD-10-CM

## 2023-09-07 DIAGNOSIS — E78.5 HYPERLIPIDEMIA, UNSPECIFIED HYPERLIPIDEMIA TYPE: ICD-10-CM

## 2023-09-07 PROCEDURE — 3078F DIAST BP <80 MM HG: CPT | Mod: ,,, | Performed by: FAMILY MEDICINE

## 2023-09-07 PROCEDURE — 1159F PR MEDICATION LIST DOCUMENTED IN MEDICAL RECORD: ICD-10-PCS | Mod: ,,, | Performed by: FAMILY MEDICINE

## 2023-09-07 PROCEDURE — 3078F PR MOST RECENT DIASTOLIC BLOOD PRESSURE < 80 MM HG: ICD-10-PCS | Mod: ,,, | Performed by: FAMILY MEDICINE

## 2023-09-07 PROCEDURE — 3075F PR MOST RECENT SYSTOLIC BLOOD PRESS GE 130-139MM HG: ICD-10-PCS | Mod: ,,, | Performed by: FAMILY MEDICINE

## 2023-09-07 PROCEDURE — 99214 PR OFFICE/OUTPT VISIT, EST, LEVL IV, 30-39 MIN: ICD-10-PCS | Mod: ,,, | Performed by: FAMILY MEDICINE

## 2023-09-07 PROCEDURE — 99214 OFFICE O/P EST MOD 30 MIN: CPT | Mod: ,,, | Performed by: FAMILY MEDICINE

## 2023-09-07 PROCEDURE — 3075F SYST BP GE 130 - 139MM HG: CPT | Mod: ,,, | Performed by: FAMILY MEDICINE

## 2023-09-07 PROCEDURE — 1159F MED LIST DOCD IN RCRD: CPT | Mod: ,,, | Performed by: FAMILY MEDICINE

## 2023-09-07 RX ORDER — FLUOROMETHOLONE 1 MG/ML
1 SUSPENSION/ DROPS OPHTHALMIC DAILY
COMMUNITY
Start: 2023-08-21 | End: 2023-10-31

## 2023-09-07 RX ORDER — ZOLPIDEM TARTRATE 10 MG/1
TABLET ORAL
Qty: 90 TABLET | Refills: 1 | Status: SHIPPED | OUTPATIENT
Start: 2023-09-07 | End: 2024-03-08

## 2023-09-07 NOTE — PROGRESS NOTES
SUBJECTIVE:  Alissa Najera is a 82 y.o. male here for follow-up      HPI  82-year-old male here for follow-up.  He is doing pretty well at this time.  He is is normal aches and pains in his neck in back but his angina has been doing well.  Augustuss allergies, medications, history, and problem list were updated as appropriate.    Review of Systems   A comprehensive review of symptoms was completed and negative except as noted above.    Recent Results (from the past 504 hour(s))   Comprehensive Metabolic Panel    Collection Time: 08/31/23  9:18 AM   Result Value Ref Range    Sodium Level 141 135 - 145 mmol/L    Potassium Level 4.4 3.5 - 5.1 mmol/L    Chloride 104 98 - 110 mmol/L    Carbon Dioxide 27 21 - 32 mmol/L    Glucose Level 107 70 - 115 mg/dL    Blood Urea Nitrogen 22.0 (H) 7.0 - 20.0 mg/dL    Creatinine 0.78 0.66 - 1.25 mg/dL    Calcium Level Total 9.2 8.4 - 10.2 mg/dL    Protein Total 6.6 6.3 - 8.2 gm/dL    Albumin Level 4.2 3.4 - 5.0 g/dL    Globulin 2.4 2.0 - 3.9 gm/dL    Albumin/Globulin Ratio 1.8 ratio    Bilirubin Total 0.7 0.0 - 1.0 mg/dL    Alkaline Phosphatase 162 (H) 50 - 144 unit/L    Alanine Aminotransferase 64 (H) 1 - 45 unit/L    Aspartate Aminotransferase 51 17 - 59 unit/L    eGFR 89 mls/min/1.73/m2    Anion Gap 10.0 2.0 - 13.0 mEq/L    BUN/Creatinine Ratio 28 (H) 12 - 20   CBC with Differential    Collection Time: 08/31/23  9:18 AM   Result Value Ref Range    WBC 6.22 4.00 - 11.50 x10(3)/mcL    RBC 4.75 4.00 - 6.00 x10(6)/mcL    Hgb 13.4 13.0 - 18.0 g/dL    Hct 40.0 36.0 - 52.0 %    MCV 84.2 79.0 - 99.0 fL    MCH 28.2 27.0 - 34.0 pg    MCHC 33.5 31.0 - 37.0 g/dL    RDW 13.6 %    Platelet 286 140 - 371 x10(3)/mcL    MPV 9.7 9.4 - 12.4 fL    Neut % 67.3 37 - 73 %    Lymph % 19.8 (L) 20 - 55 %    Mono % 9.5 4.7 - 12.5 %    Eos % 1.9 0.7 - 7 %    Basophil % 0.5 0.1 - 1.2 %    Lymph # 1.23 (L) 1.32 - 3.57 x10(3)/mcL    Neut # 4.19 1.78 - 5.38 x10(3)/mcL    Mono # 0.59 0.3 - 0.82 x10(3)/mcL    Eos  "# 0.12 0.04 - 0.54 x10(3)/mcL    Baso # 0.03 0.01 - 0.08 x10(3)/mcL    IG# 0.06 (H) 0.0001 - 0.031 x10(3)/mcL    IG% 1.0 (H) 0 - 0.5 %    NRBC% 0.0 <=1 %       OBJECTIVE:  Vital signs  Vitals:    09/07/23 1508   BP: 130/74   BP Location: Left arm   Patient Position: Sitting   Pulse: 73   Temp: 96.4 °F (35.8 °C)   TempSrc: Temporal   SpO2: 98%   Weight: 72.1 kg (159 lb)   Height: 5' 10.87" (1.8 m)        Physical Exam heart with a regular rate and rhythm    ASSESSMENT/PLAN:  1. Elevated alkaline phosphatase level  Alkaline phosphatase is 164 which is stable.  He has a history of previous Whipple procedure from malignancy.  He did see his surgeon in [place] about 6 months ago and scans did not show anything new.  I am happy to see that he has gained some weight recently which makes any sort of his malignancy less likely  2. Spinal stenosis of lumbar region without neurogenic claudication  Stable    3. Hyperlipidemia, unspecified hyperlipidemia type  On atorvastatin 40 mg daily    4. Primary hypertension  Blood pressure is well controlled on amlodipine metoprolol    5. Protein deficiency disease  Current protein is 6.6 which is stable and doing well    6. Insomnia, unspecified type  On Ambien daily which he has been on for years  -     zolpidem (AMBIEN) 10 mg Tab; TAKE 1 TABLET BY MOUTH EVERY DAY IN THE EVENING  Dispense: 90 tablet; Refill: 1    7. Coronary artery disease involving native coronary artery of native heart, unspecified whether angina present  No current angina.  Stable on metoprolol         Follow Up:  Follow up in about 6 months (around 3/7/2024) for recheck, fasting labs.  And Medicare wellness          "

## 2023-09-12 ENCOUNTER — TELEPHONE (OUTPATIENT)
Dept: FAMILY MEDICINE | Facility: CLINIC | Age: 83
End: 2023-09-12
Payer: MEDICARE

## 2023-10-04 ENCOUNTER — CLINICAL SUPPORT (OUTPATIENT)
Dept: FAMILY MEDICINE | Facility: CLINIC | Age: 83
End: 2023-10-04
Payer: MEDICARE

## 2023-10-04 DIAGNOSIS — Z23 ENCOUNTER FOR IMMUNIZATION: Primary | ICD-10-CM

## 2023-10-04 PROCEDURE — G0008 ADMIN INFLUENZA VIRUS VAC: HCPCS | Mod: ,,, | Performed by: FAMILY MEDICINE

## 2023-10-04 PROCEDURE — G0008 FLU VACCINE - QUADRIVALENT - ADJUVANTED: ICD-10-PCS | Mod: ,,, | Performed by: FAMILY MEDICINE

## 2023-10-04 PROCEDURE — 90694 VACC AIIV4 NO PRSRV 0.5ML IM: CPT | Mod: ,,, | Performed by: FAMILY MEDICINE

## 2023-10-04 PROCEDURE — 90694 FLU VACCINE - QUADRIVALENT - ADJUVANTED: ICD-10-PCS | Mod: ,,, | Performed by: FAMILY MEDICINE

## 2023-10-31 ENCOUNTER — OFFICE VISIT (OUTPATIENT)
Dept: FAMILY MEDICINE | Facility: CLINIC | Age: 83
End: 2023-10-31
Payer: MEDICARE

## 2023-10-31 VITALS
WEIGHT: 161 LBS | TEMPERATURE: 98 F | HEIGHT: 71 IN | BODY MASS INDEX: 22.54 KG/M2 | OXYGEN SATURATION: 96 % | HEART RATE: 70 BPM

## 2023-10-31 DIAGNOSIS — R50.9 FEVER, UNSPECIFIED FEVER CAUSE: Primary | ICD-10-CM

## 2023-10-31 DIAGNOSIS — U07.1 COVID-19 VIRUS INFECTION: ICD-10-CM

## 2023-10-31 DIAGNOSIS — R05.9 COUGH, UNSPECIFIED TYPE: ICD-10-CM

## 2023-10-31 LAB
CTP QC/QA: YES
CTP QC/QA: YES
FLUAV AG NPH QL: POSITIVE
FLUBV AG NPH QL: NEGATIVE
SARS-COV-2 AG RESP QL IA.RAPID: POSITIVE

## 2023-10-31 PROCEDURE — 87811 SARS-COV-2 COVID19 W/OPTIC: CPT | Mod: QW,RHCUB | Performed by: FAMILY MEDICINE

## 2023-10-31 PROCEDURE — 1159F PR MEDICATION LIST DOCUMENTED IN MEDICAL RECORD: ICD-10-PCS | Mod: ,,, | Performed by: FAMILY MEDICINE

## 2023-10-31 PROCEDURE — 87400 INFLUENZA A/B EACH AG IA: CPT | Mod: QW,RHCUB | Performed by: FAMILY MEDICINE

## 2023-10-31 PROCEDURE — 99213 PR OFFICE/OUTPT VISIT, EST, LEVL III, 20-29 MIN: ICD-10-PCS | Mod: ,,, | Performed by: FAMILY MEDICINE

## 2023-10-31 PROCEDURE — 1159F MED LIST DOCD IN RCRD: CPT | Mod: ,,, | Performed by: FAMILY MEDICINE

## 2023-10-31 PROCEDURE — 99213 OFFICE O/P EST LOW 20 MIN: CPT | Mod: ,,, | Performed by: FAMILY MEDICINE

## 2023-10-31 RX ORDER — CYCLOSPORINE 0.5 MG/ML
1 EMULSION OPHTHALMIC 2 TIMES DAILY
COMMUNITY
Start: 2023-10-20

## 2023-10-31 RX ORDER — PROMETHAZINE HYDROCHLORIDE 25 MG/1
25 TABLET ORAL EVERY 6 HOURS PRN
Qty: 12 TABLET | Refills: 0 | Status: SHIPPED | OUTPATIENT
Start: 2023-10-31 | End: 2023-11-03

## 2023-10-31 RX ORDER — HYDROCODONE POLISTIREX AND CHLORPHENIRAMINE POLISTIREX 10; 8 MG/5ML; MG/5ML
5 SUSPENSION, EXTENDED RELEASE ORAL EVERY 12 HOURS PRN
Qty: 115 ML | Refills: 0 | Status: SHIPPED | OUTPATIENT
Start: 2023-10-31 | End: 2023-11-12

## 2023-10-31 NOTE — PROGRESS NOTES
"SUBJECTIVE:  Alissa Najera is a 82 y.o. male here for Cough, Emesis, Nasal Congestion, Generalized Body Aches, Sore Throat, and Headache      HPI  Patient has been having 2 days of generalized body aches sore throat nasal congestion cough headache and nausea.  His wife had similar symptoms that started 5 days prior  Augustuss allergies, medications, history, and problem list were updated as appropriate.    Review of Systems   See HPI    Recent Results (from the past 504 hour(s))   SARS Coronavirus 2 Antigen, POCT Manual Read    Collection Time: 10/31/23  3:43 PM   Result Value Ref Range    SARS Coronavirus 2 Antigen Positive (A) Negative     Acceptable Yes    POCT Influenza A/B Rapid Antigen    Collection Time: 10/31/23  3:46 PM   Result Value Ref Range    Rapid Influenza A Ag Positive (A) Negative    Rapid Influenza B Ag Negative Negative     Acceptable Yes        OBJECTIVE:  Vital signs  Vitals:    10/31/23 1521   Pulse: 70   Temp: 98.2 °F (36.8 °C)   TempSrc: Temporal   SpO2: 96%   Weight: 73 kg (161 lb)   Height: 5' 10.87" (1.8 m)        Physical Exam lungs are clear to auscultation bilateral.  He does look like he feels bad but not acutely ill    ASSESSMENT/PLAN:  1. Fever, unspecified fever cause  Flu and COVID tested both are positive today  -     POCT Influenza A/B Rapid Antigen  -     SARS Coronavirus 2 Antigen, POCT Manual Read    2. COVID-19 virus infection  He is not having any respiratory distress.  Symptomatic relief.  Make sure he stays well hydrated, call the office back if he starts to feel weak    3. Cough, unspecified type  Tussionex    Other orders  -     hydrocodone-chlorpheniramine (TUSSIONEX) 10-8 mg/5 mL suspension; Take 5 mLs by mouth every 12 (twelve) hours as needed for Cough.  Dispense: 115 mL; Refill: 0  -     promethazine (PHENERGAN) 25 MG tablet; Take 1 tablet (25 mg total) by mouth every 6 (six) hours as needed for Nausea.  Dispense: 12 tablet; Refill: " 0         Follow Up:  No follow-ups on file.

## 2023-11-01 DIAGNOSIS — K21.9 GASTROESOPHAGEAL REFLUX DISEASE, UNSPECIFIED WHETHER ESOPHAGITIS PRESENT: Primary | ICD-10-CM

## 2023-11-01 RX ORDER — PANTOPRAZOLE SODIUM 40 MG/1
40 TABLET, DELAYED RELEASE ORAL EVERY MORNING
Qty: 90 TABLET | Refills: 3 | Status: SHIPPED | OUTPATIENT
Start: 2023-11-01

## 2023-11-03 ENCOUNTER — PATIENT MESSAGE (OUTPATIENT)
Dept: RESEARCH | Facility: HOSPITAL | Age: 83
End: 2023-11-03
Payer: MEDICARE

## 2023-11-23 DIAGNOSIS — E78.5 HYPERLIPIDEMIA, UNSPECIFIED HYPERLIPIDEMIA TYPE: ICD-10-CM

## 2023-11-27 RX ORDER — ATORVASTATIN CALCIUM 40 MG/1
TABLET, FILM COATED ORAL
Qty: 90 TABLET | Refills: 1 | Status: SHIPPED | OUTPATIENT
Start: 2023-11-27

## 2023-11-29 ENCOUNTER — TELEPHONE (OUTPATIENT)
Dept: FAMILY MEDICINE | Facility: CLINIC | Age: 83
End: 2023-11-29
Payer: MEDICARE

## 2024-03-08 DIAGNOSIS — G47.00 INSOMNIA, UNSPECIFIED TYPE: ICD-10-CM

## 2024-03-08 RX ORDER — ZOLPIDEM TARTRATE 10 MG/1
TABLET ORAL
Qty: 90 TABLET | Refills: 1 | Status: SHIPPED | OUTPATIENT
Start: 2024-03-08

## 2024-03-15 PROCEDURE — 80061 LIPID PANEL: CPT | Performed by: FAMILY MEDICINE

## 2024-03-15 PROCEDURE — 83036 HEMOGLOBIN GLYCOSYLATED A1C: CPT | Performed by: FAMILY MEDICINE

## 2024-03-15 PROCEDURE — 80053 COMPREHEN METABOLIC PANEL: CPT | Performed by: FAMILY MEDICINE

## 2024-03-15 PROCEDURE — 84443 ASSAY THYROID STIM HORMONE: CPT | Performed by: FAMILY MEDICINE

## 2024-03-15 PROCEDURE — 85025 COMPLETE CBC W/AUTO DIFF WBC: CPT | Performed by: FAMILY MEDICINE

## 2024-03-21 ENCOUNTER — OFFICE VISIT (OUTPATIENT)
Dept: FAMILY MEDICINE | Facility: CLINIC | Age: 84
End: 2024-03-21
Payer: MEDICARE

## 2024-03-21 VITALS
BODY MASS INDEX: 21.95 KG/M2 | HEIGHT: 71 IN | DIASTOLIC BLOOD PRESSURE: 70 MMHG | SYSTOLIC BLOOD PRESSURE: 126 MMHG | WEIGHT: 156.81 LBS

## 2024-03-21 DIAGNOSIS — H35.3132 INTERMEDIATE STAGE NONEXUDATIVE AGE-RELATED MACULAR DEGENERATION OF BOTH EYES: ICD-10-CM

## 2024-03-21 DIAGNOSIS — I25.119 ATHEROSCLEROSIS OF NATIVE CORONARY ARTERY OF NATIVE HEART WITH ANGINA PECTORIS: ICD-10-CM

## 2024-03-21 DIAGNOSIS — I25.10 CORONARY ARTERY DISEASE INVOLVING NATIVE CORONARY ARTERY OF NATIVE HEART, UNSPECIFIED WHETHER ANGINA PRESENT: ICD-10-CM

## 2024-03-21 DIAGNOSIS — M48.061 SPINAL STENOSIS OF LUMBAR REGION WITHOUT NEUROGENIC CLAUDICATION: Primary | ICD-10-CM

## 2024-03-21 DIAGNOSIS — E46: ICD-10-CM

## 2024-03-21 DIAGNOSIS — M89.9 DISORDER OF BONE, UNSPECIFIED: ICD-10-CM

## 2024-03-21 DIAGNOSIS — Z00.00 MEDICARE ANNUAL WELLNESS VISIT, SUBSEQUENT: ICD-10-CM

## 2024-03-21 DIAGNOSIS — E78.5 HYPERLIPIDEMIA, UNSPECIFIED HYPERLIPIDEMIA TYPE: ICD-10-CM

## 2024-03-21 DIAGNOSIS — I10 PRIMARY HYPERTENSION: ICD-10-CM

## 2024-03-21 DIAGNOSIS — R73.01 IMPAIRED FASTING GLUCOSE: ICD-10-CM

## 2024-03-21 DIAGNOSIS — K86.89 PANCREATIC INSUFFICIENCY: ICD-10-CM

## 2024-03-21 PROCEDURE — 3288F FALL RISK ASSESSMENT DOCD: CPT | Mod: ,,, | Performed by: FAMILY MEDICINE

## 2024-03-21 PROCEDURE — 1159F MED LIST DOCD IN RCRD: CPT | Mod: ,,, | Performed by: FAMILY MEDICINE

## 2024-03-21 PROCEDURE — 99497 ADVNCD CARE PLAN 30 MIN: CPT | Mod: ,,, | Performed by: FAMILY MEDICINE

## 2024-03-21 PROCEDURE — G0439 PPPS, SUBSEQ VISIT: HCPCS | Mod: ,,, | Performed by: FAMILY MEDICINE

## 2024-03-21 PROCEDURE — 3074F SYST BP LT 130 MM HG: CPT | Mod: ,,, | Performed by: FAMILY MEDICINE

## 2024-03-21 PROCEDURE — 1101F PT FALLS ASSESS-DOCD LE1/YR: CPT | Mod: ,,, | Performed by: FAMILY MEDICINE

## 2024-03-21 PROCEDURE — 3078F DIAST BP <80 MM HG: CPT | Mod: ,,, | Performed by: FAMILY MEDICINE

## 2024-03-21 PROCEDURE — 99214 OFFICE O/P EST MOD 30 MIN: CPT | Mod: 25,,, | Performed by: FAMILY MEDICINE

## 2024-03-21 NOTE — PROGRESS NOTES
SUBJECTIVE:  Alissa Najera is a 83 y.o. male here for Medicare AWV      HPI  Patient here for annual Medicare wellness visit.  Also her for follow-up on chronic medical conditions.  He is noting that he is getting more fatigued especially when he exerts himself at all.  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  Augustuss allergies, medications, history, and problem list were updated as appropriate.    Review of Systems   Constitutional:  Positive for fatigue. Negative for activity change, appetite change and fever.   HENT:  Negative for congestion, ear pain, hearing loss, sore throat and trouble swallowing.    Eyes:  Negative for photophobia, pain, redness and visual disturbance.   Respiratory:  Negative for cough, chest tightness, shortness of breath and wheezing.    Cardiovascular:  Positive for chest pain. Negative for palpitations and leg swelling.   Gastrointestinal:  Negative for abdominal distention, abdominal pain and blood in stool.   Endocrine: Negative for cold intolerance, heat intolerance, polydipsia and polyuria.   Genitourinary:  Negative for difficulty urinating, dysuria and frequency.   Musculoskeletal:  Positive for arthralgias, back pain and neck pain. Negative for gait problem, joint swelling and myalgias.   Skin:  Negative for color change, pallor and rash.   Allergic/Immunologic: Negative.    Neurological:  Negative for dizziness, seizures, speech difficulty, weakness and headaches.   Hematological:  Negative for adenopathy. Does not bruise/bleed easily.   Psychiatric/Behavioral:  Negative for agitation and confusion.       A comprehensive review of symptoms was completed and negative except as noted above.    Recent Results (from the past 504 hour(s))   Comprehensive Metabolic Panel    Collection Time: 03/15/24  8:30 AM   Result Value Ref Range    Sodium Level 140 135 - 145 mmol/L    Potassium Level 4.2 3.5 - 5.1 mmol/L     Chloride 108 98 - 110 mmol/L    Carbon Dioxide 27 21 - 32 mmol/L    Glucose Level 118 (H) 70 - 115 mg/dL    Blood Urea Nitrogen 20.0 7.0 - 20.0 mg/dL    Creatinine 0.83 0.66 - 1.25 mg/dL    Calcium Level Total 9.0 8.4 - 10.2 mg/dL    Protein Total 6.6 6.3 - 8.2 gm/dL    Albumin Level 4.1 3.4 - 5.0 g/dL    Globulin 2.5 2.0 - 3.9 gm/dL    Albumin/Globulin Ratio 1.6 ratio    Bilirubin Total 0.5 0.0 - 1.0 mg/dL    Alkaline Phosphatase 148 (H) 50 - 144 unit/L    Alanine Aminotransferase 55 (H) 1 - 45 unit/L    Aspartate Aminotransferase 54 17 - 59 unit/L    eGFR 87 mls/min/1.73/m2    Anion Gap 5.0 2.0 - 13.0 mEq/L    BUN/Creatinine Ratio 24 (H) 12 - 20   Lipid Panel    Collection Time: 03/15/24  8:30 AM   Result Value Ref Range    Cholesterol Total 160 0 - 200 mg/dL    HDL Cholesterol 50 40 - 60 mg/dL    Triglyceride 163 30 - 200 mg/dL    LDL Cholesterol Direct 77.2 30.0 - 100.0 mg/dL   TSH    Collection Time: 03/15/24  8:30 AM   Result Value Ref Range    TSH 2.030 0.360 - 3.740 uIU/mL   Hemoglobin A1C    Collection Time: 03/15/24  8:30 AM   Result Value Ref Range    Hemoglobin A1c 5.9 4.0 - 6.0 %    Estimated Average Glucose 122.6 (H) 70.0 - 115.0 mg/dL   CBC with Differential    Collection Time: 03/15/24  8:30 AM   Result Value Ref Range    WBC 6.29 4.00 - 11.50 x10(3)/mcL    RBC 4.87 4.00 - 6.00 x10(6)/mcL    Hgb 13.6 13.0 - 18.0 g/dL    Hct 41.2 36.0 - 52.0 %    MCV 84.6 79.0 - 99.0 fL    MCH 27.9 27.0 - 34.0 pg    MCHC 33.0 31.0 - 37.0 g/dL    RDW 13.7 %    Platelet 275 140 - 371 x10(3)/mcL    MPV 9.7 9.4 - 12.4 fL    Neut % 65.6 37 - 73 %    Lymph % 20.8 20 - 55 %    Mono % 9.5 4.7 - 12.5 %    Eos % 3.0 0.7 - 7 %    Basophil % 0.6 0.1 - 1.2 %    Lymph # 1.31 (L) 1.32 - 3.57 x10(3)/mcL    Neut # 4.12 1.78 - 5.38 x10(3)/mcL    Mono # 0.60 0.3 - 0.82 x10(3)/mcL    Eos # 0.19 0.04 - 0.54 x10(3)/mcL    Baso # 0.04 0.01 - 0.08 x10(3)/mcL    IG# 0.03 0.0001 - 0.031 x10(3)/mcL    IG% 0.5 0 - 0.5 %    NRBC% 0.0 <=1 %  "      OBJECTIVE:  Vital signs  Vitals:    03/21/24 1423   BP: 126/70   BP Location: Right arm   Patient Position: Sitting   Weight: 71.1 kg (156 lb 12.8 oz)   Height: 5' 10.87" (1.8 m)        Physical Exam  Vitals and nursing note reviewed.   Constitutional:       General: He is not in acute distress.     Appearance: Normal appearance. He is not ill-appearing.   HENT:      Head: Normocephalic and atraumatic.      Right Ear: External ear normal.      Left Ear: External ear normal.      Nose: Nose normal.      Mouth/Throat:      Mouth: Mucous membranes are moist.      Pharynx: Oropharynx is clear.   Eyes:      Extraocular Movements: Extraocular movements intact.      Conjunctiva/sclera: Conjunctivae normal.   Cardiovascular:      Rate and Rhythm: Normal rate and regular rhythm.      Heart sounds: Normal heart sounds. No murmur heard.  Pulmonary:      Effort: Pulmonary effort is normal.      Breath sounds: Normal breath sounds. No wheezing or rhonchi.   Abdominal:      General: Abdomen is flat. There is no distension.      Palpations: Abdomen is soft.      Tenderness: There is no abdominal tenderness.   Musculoskeletal:         General: No swelling or deformity. Normal range of motion.      Cervical back: Normal range of motion and neck supple.   Skin:     General: Skin is warm and dry.      Findings: No bruising or rash.   Neurological:      General: No focal deficit present.      Mental Status: He is alert and oriented to person, place, and time.      Cranial Nerves: No cranial nerve deficit.      Motor: No weakness.   Psychiatric:         Mood and Affect: Mood normal.         Behavior: Behavior normal.         Thought Content: Thought content normal.          ASSESSMENT/PLAN:  1. Spinal stenosis of lumbar region without neurogenic claudication  He is having some increase leg pain and weakness with walking    2. Primary hypertension  Blood pressure well controlled  Overview:  On amlodipine and metoprolol      3. " Hyperlipidemia, unspecified hyperlipidemia type    Overview:  Lab Results   Component Value Date    CHOL 160 03/15/2024    HDL 50 03/15/2024    TRIG 163 03/15/2024    DLDL 77.2 03/15/2024    DLDL 73.9 04/12/2023        Assessment & Plan:  On atorvastatin      4. Coronary artery disease involving native coronary artery of native heart, unspecified whether angina present  He is taking nitroglycerin once a week but stable  Overview:  On aspirin statin and nitroglycerin as needed      5. Protein deficiency disease      6. Impaired fasting glucose    Overview:  Lab Results   Component Value Date    HGBA1C 5.9 03/15/2024    HGBA1C 5.8 (H) 05/10/2023    HGBA1C 5.8 04/12/2023           7. Medicare annual wellness visit, subsequent  Up-to-date on colon cancer screening    I offered to discuss advanced care planning,  and how to pick a person who would make decisions for you if you were unable to make them for herself, called a health care power of , and what kind of decisions you might make such as use of life-sustaining treatments such as ventilators and tube feeding when faced with a life-limiting illness recorded on a living will that they will need to know.( How to be cared for as you near the end of your natural life)    Patient is interested in learning more about how to make advance directives.  I provided them paperwork and offered to discuss this with them.     Other Medicare services include follow-up for cancer at [facility]    8. Atherosclerosis of native coronary artery of native heart with angina pectoris    Overview:  On aspirin statin and nitroglycerin as needed      9. Intermediate stage nonexudative age-related macular degeneration of both eyes      10. Pancreatic insufficiency  He is taking his pancreatic enzymes         Follow Up:  Follow up in about 6 months (around 9/21/2024) for recheck, fasting labs.             No

## 2024-05-22 DIAGNOSIS — E78.5 HYPERLIPIDEMIA, UNSPECIFIED HYPERLIPIDEMIA TYPE: ICD-10-CM

## 2024-05-22 RX ORDER — ATORVASTATIN CALCIUM 40 MG/1
TABLET, FILM COATED ORAL
Qty: 90 TABLET | Refills: 1 | Status: SHIPPED | OUTPATIENT
Start: 2024-05-22

## 2024-06-18 ENCOUNTER — TELEPHONE (OUTPATIENT)
Dept: FAMILY MEDICINE | Facility: CLINIC | Age: 84
End: 2024-06-18
Payer: MEDICARE

## 2024-06-26 DIAGNOSIS — F32.A DEPRESSIVE DISORDER: ICD-10-CM

## 2024-06-26 RX ORDER — VENLAFAXINE HYDROCHLORIDE 150 MG/1
CAPSULE, EXTENDED RELEASE ORAL
Qty: 90 CAPSULE | Refills: 3 | Status: SHIPPED | OUTPATIENT
Start: 2024-06-26

## 2024-09-03 ENCOUNTER — TELEPHONE (OUTPATIENT)
Dept: FAMILY MEDICINE | Facility: CLINIC | Age: 84
End: 2024-09-03
Payer: MEDICARE

## 2024-09-03 DIAGNOSIS — E55.9 VITAMIN D DEFICIENCY: Primary | ICD-10-CM

## 2024-09-03 DIAGNOSIS — G47.00 INSOMNIA, UNSPECIFIED TYPE: ICD-10-CM

## 2024-09-03 RX ORDER — ZOLPIDEM TARTRATE 10 MG/1
TABLET ORAL
Qty: 90 TABLET | Refills: 1 | Status: SHIPPED | OUTPATIENT
Start: 2024-09-03

## 2024-09-03 RX ORDER — ERGOCALCIFEROL 1.25 MG/1
50000 CAPSULE ORAL
Qty: 8 CAPSULE | Refills: 11 | Status: SHIPPED | OUTPATIENT
Start: 2024-09-05 | End: 2025-09-05

## 2024-09-14 DIAGNOSIS — E78.5 HYPERLIPIDEMIA, UNSPECIFIED HYPERLIPIDEMIA TYPE: ICD-10-CM

## 2024-09-16 RX ORDER — ATORVASTATIN CALCIUM 40 MG/1
TABLET, FILM COATED ORAL
Qty: 90 TABLET | Refills: 1 | Status: SHIPPED | OUTPATIENT
Start: 2024-09-16

## 2024-10-15 PROCEDURE — 82306 VITAMIN D 25 HYDROXY: CPT | Performed by: FAMILY MEDICINE

## 2024-10-15 PROCEDURE — 84207 ASSAY OF VITAMIN B-6: CPT | Performed by: FAMILY MEDICINE

## 2024-10-15 PROCEDURE — 85025 COMPLETE CBC W/AUTO DIFF WBC: CPT | Performed by: FAMILY MEDICINE

## 2024-10-15 PROCEDURE — 82607 VITAMIN B-12: CPT | Performed by: FAMILY MEDICINE

## 2024-10-15 PROCEDURE — 80053 COMPREHEN METABOLIC PANEL: CPT | Performed by: FAMILY MEDICINE

## 2024-10-15 PROCEDURE — 80061 LIPID PANEL: CPT | Performed by: FAMILY MEDICINE

## 2024-11-06 ENCOUNTER — OFFICE VISIT (OUTPATIENT)
Dept: FAMILY MEDICINE | Facility: CLINIC | Age: 84
End: 2024-11-06
Payer: MEDICARE

## 2024-11-06 VITALS
SYSTOLIC BLOOD PRESSURE: 118 MMHG | BODY MASS INDEX: 22.78 KG/M2 | OXYGEN SATURATION: 95 % | WEIGHT: 159.13 LBS | DIASTOLIC BLOOD PRESSURE: 72 MMHG | HEART RATE: 62 BPM | HEIGHT: 70 IN | TEMPERATURE: 97 F

## 2024-11-06 DIAGNOSIS — R79.9 ABNORMAL FINDING OF BLOOD CHEMISTRY, UNSPECIFIED: ICD-10-CM

## 2024-11-06 DIAGNOSIS — Z90.49 HISTORY OF WHIPPLE PROCEDURE: ICD-10-CM

## 2024-11-06 DIAGNOSIS — R74.8 ELEVATED ALKALINE PHOSPHATASE LEVEL: Primary | ICD-10-CM

## 2024-11-06 DIAGNOSIS — Z90.410 HISTORY OF WHIPPLE PROCEDURE: ICD-10-CM

## 2024-11-06 DIAGNOSIS — I10 PRIMARY HYPERTENSION: ICD-10-CM

## 2024-11-06 DIAGNOSIS — E46: ICD-10-CM

## 2024-11-06 DIAGNOSIS — I25.119 ATHEROSCLEROSIS OF NATIVE CORONARY ARTERY OF NATIVE HEART WITH ANGINA PECTORIS: ICD-10-CM

## 2024-11-06 PROCEDURE — 3078F DIAST BP <80 MM HG: CPT | Mod: ,,, | Performed by: FAMILY MEDICINE

## 2024-11-06 PROCEDURE — 1159F MED LIST DOCD IN RCRD: CPT | Mod: ,,, | Performed by: FAMILY MEDICINE

## 2024-11-06 PROCEDURE — 99214 OFFICE O/P EST MOD 30 MIN: CPT | Mod: ,,, | Performed by: FAMILY MEDICINE

## 2024-11-06 PROCEDURE — 3074F SYST BP LT 130 MM HG: CPT | Mod: ,,, | Performed by: FAMILY MEDICINE

## 2024-11-06 RX ORDER — ISOSORBIDE MONONITRATE 30 MG/1
30 TABLET, EXTENDED RELEASE ORAL DAILY
Qty: 90 TABLET | Refills: 3 | Status: SHIPPED | OUTPATIENT
Start: 2024-11-06 | End: 2025-11-06

## 2024-11-07 NOTE — PROGRESS NOTES
"SUBJECTIVE:  Alissa Najera is a 83 y.o. male here for Follow-up (F/U 6M LAB)      HPI  Patient is here for follow-up on chronic medical conditions.  See assessment and plan for individual list of issues.  He has been having a little bit pain in his back more so than usual in the pancreatic area.  He has been noticed some early satiety in his had some weight loss.  He has has a history of Whipple procedure many years ago.  He is on pancreatic enzymes for pancreatic insufficiency and has been taking this he has been having an elevation of his alkaline phosphatase and now ALT level has gone up some as well.  He has been having little increase in his angina with exertion.  Juan David's allergies, medications, history, and problem list were updated as appropriate.    Review of Systems   See [facility]    No results found for this or any previous visit (from the past 3 weeks).    OBJECTIVE:  Vital signs  Vitals:    11/06/24 1008   BP: 118/72   BP Location: Right arm   Patient Position: Sitting   Pulse: 62   Temp: 97.4 °F (36.3 °C)   TempSrc: Oral   SpO2: 95%   Weight: 72.2 kg (159 lb 2 oz)   Height: 5' 10" (1.778 m)        Physical Exam regular rate and rhythm, abdomen is soft and nontender    ASSESSMENT/PLAN:  1. Elevated alkaline phosphatase level  I am concerned for something going on in the biliary tract.  I would like to send a note to his previous surgeon that [facility] to see what they think    2. Primary hypertension  Stable  Overview:  On amlodipine and metoprolol      3. Atherosclerosis of native coronary artery of native heart with angina pectoris  He responds to nitroglycerin and has not had to use this more often saw would like to add a long-acting nitroglycerin in the morning to try   Overview:  On aspirin statin and nitroglycerin as needed      4. Protein deficiency disease  Continue pancreatic enzymes    5. History of Whipple procedure      Other orders  -     isosorbide mononitrate (IMDUR) 30 MG 24 hr tablet; Take " 1 tablet (30 mg total) by mouth once daily.  Dispense: 90 tablet; Refill: 3         Follow Up:  Follow up in about 6 months (around 5/6/2025) for recheck, fasting labs, wellness.

## 2024-11-20 DIAGNOSIS — E78.5 HYPERLIPIDEMIA, UNSPECIFIED HYPERLIPIDEMIA TYPE: ICD-10-CM

## 2024-11-20 RX ORDER — ATORVASTATIN CALCIUM 40 MG/1
40 TABLET, FILM COATED ORAL DAILY
Qty: 90 TABLET | Refills: 1 | Status: SHIPPED | OUTPATIENT
Start: 2024-11-20

## 2024-12-02 DIAGNOSIS — K21.9 GASTROESOPHAGEAL REFLUX DISEASE, UNSPECIFIED WHETHER ESOPHAGITIS PRESENT: ICD-10-CM

## 2024-12-02 RX ORDER — PANTOPRAZOLE SODIUM 40 MG/1
40 TABLET, DELAYED RELEASE ORAL
Qty: 90 TABLET | Refills: 3 | Status: SHIPPED | OUTPATIENT
Start: 2024-12-02

## 2025-01-02 ENCOUNTER — OFFICE VISIT (OUTPATIENT)
Dept: FAMILY MEDICINE | Facility: CLINIC | Age: 85
End: 2025-01-02
Payer: MEDICARE

## 2025-01-02 VITALS
WEIGHT: 157.38 LBS | HEIGHT: 70 IN | HEART RATE: 76 BPM | DIASTOLIC BLOOD PRESSURE: 64 MMHG | BODY MASS INDEX: 22.53 KG/M2 | SYSTOLIC BLOOD PRESSURE: 114 MMHG | TEMPERATURE: 98 F | OXYGEN SATURATION: 97 %

## 2025-01-02 DIAGNOSIS — J40 BRONCHITIS: Primary | ICD-10-CM

## 2025-01-02 PROCEDURE — 1159F MED LIST DOCD IN RCRD: CPT | Mod: ,,, | Performed by: FAMILY MEDICINE

## 2025-01-02 PROCEDURE — 3074F SYST BP LT 130 MM HG: CPT | Mod: ,,, | Performed by: FAMILY MEDICINE

## 2025-01-02 PROCEDURE — 1125F AMNT PAIN NOTED PAIN PRSNT: CPT | Mod: ,,, | Performed by: FAMILY MEDICINE

## 2025-01-02 PROCEDURE — 3078F DIAST BP <80 MM HG: CPT | Mod: ,,, | Performed by: FAMILY MEDICINE

## 2025-01-02 PROCEDURE — 99213 OFFICE O/P EST LOW 20 MIN: CPT | Mod: ,,, | Performed by: FAMILY MEDICINE

## 2025-01-02 RX ORDER — DOXYCYCLINE HYCLATE 100 MG
100 TABLET ORAL 2 TIMES DAILY
Qty: 20 TABLET | Refills: 0 | Status: SHIPPED | OUTPATIENT
Start: 2025-01-02 | End: 2025-01-12

## 2025-01-02 NOTE — PROGRESS NOTES
"SUBJECTIVE:  Alissa Najera is a 84 y.o. male here for Sore Throat and Cough      HPI  Patient has been having 4 days of cough sore throat rhinorrhea.  He is now hoarse and it feels like it is settling into his chest.  He coughs a little bit of phlegm.  No fever  Ray's allergies, medications, history, and problem list were updated as appropriate.    Review of Systems   See HPI    No results found for this or any previous visit (from the past 3 weeks).    OBJECTIVE:  Vital signs  Vitals:    01/02/25 0943   BP: 114/64   BP Location: Right arm   Patient Position: Sitting   Pulse: 76   Temp: 97.6 °F (36.4 °C)   TempSrc: Oral   SpO2: 97%   Weight: 71.4 kg (157 lb 6.4 oz)   Height: 5' 10" (1.778 m)        Physical Exam lungs coarse but clear, oropharynx with posterior erythema    ASSESSMENT/PLAN:  Acute laryngitis/bronchitis - doxycycline to cover mycoplasma    Follow Up:  No follow-ups on file.            "

## 2025-03-02 DIAGNOSIS — G47.00 INSOMNIA, UNSPECIFIED TYPE: ICD-10-CM

## 2025-03-03 ENCOUNTER — TELEPHONE (OUTPATIENT)
Dept: FAMILY MEDICINE | Facility: CLINIC | Age: 85
End: 2025-03-03
Payer: MEDICARE

## 2025-03-03 RX ORDER — ZOLPIDEM TARTRATE 10 MG/1
10 TABLET ORAL NIGHTLY
Qty: 90 TABLET | Refills: 1 | Status: SHIPPED | OUTPATIENT
Start: 2025-03-03

## 2025-03-24 ENCOUNTER — TELEPHONE (OUTPATIENT)
Dept: FAMILY MEDICINE | Facility: CLINIC | Age: 85
End: 2025-03-24
Payer: MEDICARE

## 2025-03-25 NOTE — TELEPHONE ENCOUNTER
Pt was seen at [facility] yesterday. He declined the offered appts today. States that he will call back if he doesn't seem to feel better.

## 2025-04-14 ENCOUNTER — TELEPHONE (OUTPATIENT)
Dept: FAMILY MEDICINE | Facility: CLINIC | Age: 85
End: 2025-04-14
Payer: MEDICARE

## 2025-04-14 DIAGNOSIS — I10 PRIMARY HYPERTENSION: Primary | ICD-10-CM

## 2025-04-14 RX ORDER — ISOSORBIDE MONONITRATE 30 MG/1
30 TABLET, EXTENDED RELEASE ORAL 2 TIMES DAILY
Qty: 180 TABLET | Refills: 3 | Status: SHIPPED | OUTPATIENT
Start: 2025-04-14 | End: 2026-04-14

## 2025-05-14 ENCOUNTER — OFFICE VISIT (OUTPATIENT)
Dept: FAMILY MEDICINE | Facility: CLINIC | Age: 85
End: 2025-05-14
Payer: MEDICARE

## 2025-05-14 VITALS
DIASTOLIC BLOOD PRESSURE: 64 MMHG | OXYGEN SATURATION: 98 % | HEART RATE: 60 BPM | TEMPERATURE: 97 F | HEIGHT: 70 IN | WEIGHT: 156.38 LBS | BODY MASS INDEX: 22.39 KG/M2 | SYSTOLIC BLOOD PRESSURE: 122 MMHG

## 2025-05-14 DIAGNOSIS — M51.16 LUMBAR DISC HERNIATION WITH RADICULOPATHY: Primary | ICD-10-CM

## 2025-05-14 PROCEDURE — 1159F MED LIST DOCD IN RCRD: CPT | Mod: ,,, | Performed by: FAMILY MEDICINE

## 2025-05-14 PROCEDURE — 1101F PT FALLS ASSESS-DOCD LE1/YR: CPT | Mod: ,,, | Performed by: FAMILY MEDICINE

## 2025-05-14 PROCEDURE — 3288F FALL RISK ASSESSMENT DOCD: CPT | Mod: ,,, | Performed by: FAMILY MEDICINE

## 2025-05-14 PROCEDURE — 1125F AMNT PAIN NOTED PAIN PRSNT: CPT | Mod: ,,, | Performed by: FAMILY MEDICINE

## 2025-05-14 PROCEDURE — 3074F SYST BP LT 130 MM HG: CPT | Mod: ,,, | Performed by: FAMILY MEDICINE

## 2025-05-14 PROCEDURE — 99214 OFFICE O/P EST MOD 30 MIN: CPT | Mod: ,,, | Performed by: FAMILY MEDICINE

## 2025-05-14 PROCEDURE — 3078F DIAST BP <80 MM HG: CPT | Mod: ,,, | Performed by: FAMILY MEDICINE

## 2025-05-14 RX ORDER — PREDNISONE 20 MG/1
TABLET ORAL
Qty: 24 TABLET | Refills: 0 | Status: SHIPPED | OUTPATIENT
Start: 2025-05-14 | End: 2025-05-26

## 2025-05-14 NOTE — PROGRESS NOTES
"SUBJECTIVE:  Alissa Najera is a 84 y.o. male here for Back Pain (Lower)      HPI  Patient was taking some twinges with a shovel last week.  The day after he starting having acute back pain.  Over the weekend it got much worse.  He tried even taking some of his wife's Dilaudid pills that she has for when she has pain but these did not help.  It hurts anytime he tries to get up her with certain movements.  The pain is in the right side of the back radiating down the front side of his anterior thigh  Juan David's allergies, medications, history, and problem list were updated as appropriate.    Review of Systems   See HPI  No results found for this or any previous visit (from the past 3 weeks).    OBJECTIVE:  Vital signs  Vitals:    05/14/25 1304   BP: 122/64   BP Location: Right arm   Patient Position: Sitting   Pulse: 60   Temp: 97.4 °F (36.3 °C)   TempSrc: Oral   SpO2: 98%   Weight: 70.9 kg (156 lb 6.4 oz)   Height: 5' 10" (1.778 m)        Physical Exam patient has pain when trying to stand.  He walks with a limp on the right    ASSESSMENT/PLAN:  1. Lumbar disc herniation with radiculopathy  X-ray of the lumbar spine shows severe degeneration between L2 and L3 along with some scoliosis.  I suspect this is a disc herniation with radiculopathy.  We will put him on a 12 day prednisone taper and may try chiropractor  -     X-Ray Lumbar Spine AP And Lateral; Future; Expected date: 05/14/2025    Other orders  -     predniSONE (DELTASONE) 20 MG tablet; Take 3 tablets (60 mg total) by mouth once daily for 4 days, THEN 2 tablets (40 mg total) once daily for 4 days, THEN 1 tablet (20 mg total) once daily for 4 days.  Dispense: 24 tablet; Refill: 0         Follow Up:  No follow-ups on file.            "

## 2025-05-16 DIAGNOSIS — E78.5 HYPERLIPIDEMIA, UNSPECIFIED HYPERLIPIDEMIA TYPE: ICD-10-CM

## 2025-05-16 RX ORDER — ATORVASTATIN CALCIUM 40 MG/1
40 TABLET, FILM COATED ORAL
Qty: 90 TABLET | Refills: 1 | Status: SHIPPED | OUTPATIENT
Start: 2025-05-16

## 2025-05-30 ENCOUNTER — OFFICE VISIT (OUTPATIENT)
Dept: FAMILY MEDICINE | Facility: CLINIC | Age: 85
End: 2025-05-30
Payer: MEDICARE

## 2025-05-30 VITALS
TEMPERATURE: 98 F | DIASTOLIC BLOOD PRESSURE: 60 MMHG | SYSTOLIC BLOOD PRESSURE: 112 MMHG | BODY MASS INDEX: 22.02 KG/M2 | WEIGHT: 153.81 LBS | HEIGHT: 70 IN | OXYGEN SATURATION: 97 % | HEART RATE: 82 BPM

## 2025-05-30 DIAGNOSIS — M48.07 SPINAL STENOSIS, LUMBOSACRAL REGION: ICD-10-CM

## 2025-05-30 DIAGNOSIS — M54.9 DORSALGIA, UNSPECIFIED: ICD-10-CM

## 2025-05-30 DIAGNOSIS — M51.16 LUMBAR DISC HERNIATION WITH RADICULOPATHY: Primary | ICD-10-CM

## 2025-05-30 RX ORDER — PREDNISONE 20 MG/1
TABLET ORAL
Qty: 11 TABLET | Refills: 0 | Status: SHIPPED | OUTPATIENT
Start: 2025-05-30 | End: 2025-06-13

## 2025-05-30 RX ORDER — NITROGLYCERIN 0.4 MG/1
0.4 TABLET SUBLINGUAL EVERY 5 MIN PRN
COMMUNITY
Start: 2025-04-07

## 2025-05-30 NOTE — PROGRESS NOTES
"SUBJECTIVE:  Alissa Najera is a 84 y.o. male here for Back Pain (Chiropractor suggested patient come to pcp.) and Chest Pain (Chest pain, weakness, and SOB today.)      HPI  Patient is here for back pain.  He was diagnosed with suspected lumbar disc herniation a few weeks ago.  He completed his 12 day prednisone taper which did seem to help but now pain is getting worse since he stopped.  He is going to the chiropractor and went knees on the decompression machine he feels much better but then pain comes right back after leaving.  Pain is debilitating and keeping him from his activities of daily living.  He did try doing some weed eating yesterday and this seems to have aggravated the pain  Ray's allergies, medications, history, and problem list were updated as appropriate.    Review of Systems   See HPI    No results found for this or any previous visit (from the past 3 weeks).    OBJECTIVE:  Vital signs  Vitals:    05/30/25 1154   BP: 112/60   BP Location: Left arm   Patient Position: Sitting   Pulse: 82   Temp: 97.9 °F (36.6 °C)   TempSrc: Oral   SpO2: 97%   Weight: 69.8 kg (153 lb 12.8 oz)   Height: 5' 10" (1.778 m)        Physical Exam patient is walking a little bit hunched over.    ASSESSMENT/PLAN:  1. Lumbar disc herniation with radiculopathy  I would like to get an MRI of the back to see if he would benefit from epidural steroid injections  We will treat with another 2 weeks of prednisone but only 20 mg a day for 7 days then 10 mg a day for 7 days  2. Dorsalgia, unspecified    -     MRI Lumbar Spine Without Contrast; Future; Expected date: 05/30/2025    3. Spinal stenosis, lumbosacral region  Suspect he has some spinal stenosis as well  -     MRI Lumbar Spine Without Contrast; Future; Expected date: 05/30/2025    Other orders  -     predniSONE (DELTASONE) 20 MG tablet; Take 1 tablet (20 mg total) by mouth once daily for 7 days, THEN 0.5 tablets (10 mg total) once daily for 7 days.  Dispense: 11 tablet; " Refill: 0         Follow Up:  No follow-ups on file.  Has a follow up with me in 2 weeks

## 2025-06-02 PROCEDURE — 80053 COMPREHEN METABOLIC PANEL: CPT | Performed by: FAMILY MEDICINE

## 2025-06-02 PROCEDURE — 84443 ASSAY THYROID STIM HORMONE: CPT | Performed by: FAMILY MEDICINE

## 2025-06-02 PROCEDURE — 80061 LIPID PANEL: CPT | Performed by: FAMILY MEDICINE

## 2025-06-02 PROCEDURE — 85025 COMPLETE CBC W/AUTO DIFF WBC: CPT | Performed by: FAMILY MEDICINE

## 2025-06-02 PROCEDURE — 83036 HEMOGLOBIN GLYCOSYLATED A1C: CPT | Performed by: FAMILY MEDICINE

## 2025-06-09 ENCOUNTER — HOSPITAL ENCOUNTER (OUTPATIENT)
Dept: RADIOLOGY | Facility: HOSPITAL | Age: 85
Discharge: HOME | End: 2025-06-09
Attending: FAMILY MEDICINE
Payer: MEDICARE

## 2025-06-09 ENCOUNTER — TELEPHONE (OUTPATIENT)
Dept: PAIN MEDICINE | Facility: CLINIC | Age: 85
End: 2025-06-09
Payer: MEDICARE

## 2025-06-09 ENCOUNTER — TELEPHONE (OUTPATIENT)
Dept: FAMILY MEDICINE | Facility: CLINIC | Age: 85
End: 2025-06-09
Payer: MEDICARE

## 2025-06-09 DIAGNOSIS — M48.07 SPINAL STENOSIS, LUMBOSACRAL REGION: ICD-10-CM

## 2025-06-09 DIAGNOSIS — M54.9 DORSALGIA, UNSPECIFIED: ICD-10-CM

## 2025-06-09 DIAGNOSIS — M51.16 LUMBAR DISC HERNIATION WITH RADICULOPATHY: Primary | ICD-10-CM

## 2025-06-09 PROCEDURE — 72148 MRI LUMBAR SPINE W/O DYE: CPT | Mod: TC

## 2025-06-09 NOTE — TELEPHONE ENCOUNTER
Copied from CRM #[id number]. Topic: General Inquiry - Return Call  >> Jun 9, 2025  4:47 PM Janette wrote:  Type:  Patient Returning Call    Who Called:pt  Who Left Message for Patient:?  Does the patient know what this is regarding?:missed call  Would the patient rather a call back or a response via [facility]? Call back  Best Call Back Number:[phone]  Additional Information: none

## 2025-06-09 NOTE — TELEPHONE ENCOUNTER
----- Message from Toni Grigsby MD sent at 6/9/2025  1:47 PM CDT -----  Please send a referral to Shweta Cristobal for back pain

## 2025-06-10 ENCOUNTER — OFFICE VISIT (OUTPATIENT)
Dept: PAIN MEDICINE | Facility: CLINIC | Age: 85
End: 2025-06-10
Payer: MEDICARE

## 2025-06-10 VITALS
HEART RATE: 71 BPM | BODY MASS INDEX: 22.03 KG/M2 | WEIGHT: 153.88 LBS | DIASTOLIC BLOOD PRESSURE: 73 MMHG | SYSTOLIC BLOOD PRESSURE: 114 MMHG | HEIGHT: 70 IN

## 2025-06-10 DIAGNOSIS — M53.3 SACROILIAC JOINT DYSFUNCTION: ICD-10-CM

## 2025-06-10 DIAGNOSIS — G89.29 CHRONIC BILATERAL LOW BACK PAIN WITH RIGHT-SIDED SCIATICA: ICD-10-CM

## 2025-06-10 DIAGNOSIS — M54.41 CHRONIC BILATERAL LOW BACK PAIN WITH RIGHT-SIDED SCIATICA: ICD-10-CM

## 2025-06-10 DIAGNOSIS — M25.651 DECREASED RANGE OF RIGHT HIP MOVEMENT: ICD-10-CM

## 2025-06-10 DIAGNOSIS — M47.816 LUMBAR SPONDYLOSIS: ICD-10-CM

## 2025-06-10 DIAGNOSIS — M70.61 TROCHANTERIC BURSITIS OF BOTH HIPS: ICD-10-CM

## 2025-06-10 DIAGNOSIS — M54.51 VERTEBROGENIC LOW BACK PAIN: ICD-10-CM

## 2025-06-10 DIAGNOSIS — M70.62 TROCHANTERIC BURSITIS OF BOTH HIPS: ICD-10-CM

## 2025-06-10 DIAGNOSIS — M54.16 LUMBAR RADICULOPATHY: Primary | ICD-10-CM

## 2025-06-10 PROCEDURE — 99204 OFFICE O/P NEW MOD 45 MIN: CPT | Mod: S$PBB,25,, | Performed by: PHYSICAL MEDICINE & REHABILITATION

## 2025-06-10 PROCEDURE — 1125F AMNT PAIN NOTED PAIN PRSNT: CPT | Mod: CPTII,,, | Performed by: PHYSICAL MEDICINE & REHABILITATION

## 2025-06-10 PROCEDURE — 20611 DRAIN/INJ JOINT/BURSA W/US: CPT | Mod: S$PBB,RT,, | Performed by: PHYSICAL MEDICINE & REHABILITATION

## 2025-06-10 PROCEDURE — 3078F DIAST BP <80 MM HG: CPT | Mod: CPTII,,, | Performed by: PHYSICAL MEDICINE & REHABILITATION

## 2025-06-10 PROCEDURE — 1160F RVW MEDS BY RX/DR IN RCRD: CPT | Mod: CPTII,,, | Performed by: PHYSICAL MEDICINE & REHABILITATION

## 2025-06-10 PROCEDURE — 3074F SYST BP LT 130 MM HG: CPT | Mod: CPTII,,, | Performed by: PHYSICAL MEDICINE & REHABILITATION

## 2025-06-10 PROCEDURE — 1159F MED LIST DOCD IN RCRD: CPT | Mod: CPTII,,, | Performed by: PHYSICAL MEDICINE & REHABILITATION

## 2025-06-10 RX ORDER — METHYLPREDNISOLONE ACETATE 40 MG/ML
40 INJECTION, SUSPENSION INTRA-ARTICULAR; INTRALESIONAL; INTRAMUSCULAR; SOFT TISSUE
Status: DISCONTINUED | OUTPATIENT
Start: 2025-06-10 | End: 2025-06-10 | Stop reason: HOSPADM

## 2025-06-10 RX ORDER — LIDOCAINE HYDROCHLORIDE 10 MG/ML
3 INJECTION, SOLUTION INFILTRATION; PERINEURAL
Status: DISCONTINUED | OUTPATIENT
Start: 2025-06-10 | End: 2025-06-10 | Stop reason: HOSPADM

## 2025-06-10 RX ADMIN — LIDOCAINE HYDROCHLORIDE 3 ML: 10 INJECTION, SOLUTION INFILTRATION; PERINEURAL at 09:06

## 2025-06-10 RX ADMIN — METHYLPREDNISOLONE ACETATE 40 MG: 40 INJECTION, SUSPENSION INTRA-ARTICULAR; INTRALESIONAL; INTRAMUSCULAR; SOFT TISSUE at 09:06

## 2025-06-10 NOTE — PROCEDURES
Large Joint Aspiration/Injection: R greater trochanteric bursa    Date/Time: 6/10/2025 9:00 AM    Performed by: Shweta Cristobal MD  Authorized by: Shweta Cristobal MD    Consent Done?:  Yes (Written)  Indications:  Pain  Site marked: the procedure site was marked    Timeout: prior to procedure the correct patient, procedure, and site was verified    Prep: patient was prepped and draped in usual sterile fashion    Local anesthesia used?: No      Details:  Needle Size:  22 G  Ultrasonic Guidance for needle placement?: Yes    Images are saved and documented.  Approach:  Lateral  Location:  Hip  Site:  R greater trochanteric bursa  Medications:  3 mL LIDOcaine HCL 10 mg/ml (1%) 10 mg/mL (1 %); 40 mg methylPREDNISolone acetate 40 mg/mL  Patient tolerance:  Patient tolerated the procedure well with no immediate complications

## 2025-06-10 NOTE — PROGRESS NOTES
[facility]      Referring Provider: Toni Lama Md  [address] [place] [address]    Chief Complaint:   Chief Complaint   Patient presents with    Low-back Pain       History of Present Illness: Alissa Najera is a 84 y.o. male referred by Dr. Toni Lama for lower back.      HPI:  - Presents with lower back pain ongoing for approximately four weeks  - Pain began after working in the yard involving digging sod, using a wheelbarrow, and bending, lifting, and twisting  - Pain primarily located in lower back on right side and right hip, with some involvement of left side, though less severe  - Pain radiates down the front of the leg, into the right groin, and just above the knee  - Describes pain as sharp and constant, rating it as worse on the right side  - Pain exacerbated by sitting, transitioning from sitting to standing, and prolonged walking  - Lying down provides some relief  - Over the last few days, pain has begun to wake him in the mornings  - Gait has slightly changed due to pain  - History of back pain dating back to late 1970s, but states current episode is different and more severe  - Underwent two vertebral fusions in the neck in late 1980s or early 1990s in [place]  - Has received neck injections but not in the lower back  - Prescribed two rounds of steroid Dosepaks, with uncertain effectiveness of the second round  - Seen a chiropractor, Dr. Kieran Landis, who used a chair for spinal stretching but did not provide exercises  - Denies recent unexplained weight loss, changes in bowel or bladder function, numbness in leg or private areas, recent fevers, infections, night sweats, or weakness in the leg  - Denies current use of anticoagulants, aside from daily baby aspirin    PREVIOUS TREATMENTS:  - Mr. Najera tried Tylenol with no effect  - Mr. Najera tried Tizanidine with no effect  - Mr. Najera visited a chiropractor, Dr. Kieran Landis, who used a chair that stretches.  No exercises were given.  - Mr. Najera received two steroid Dosepaks. The first one seemed to help while taking it. The second round (20 mg daily for 5 days, then half dose) had no noticeable effect.  - Mr. 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    LBP  Onset: Many years of back pain, but then 4 weeks ago he was working in the yard and had worsening back pain.   Location: bilateral (primarily on the right) low back  Radiation: right lateral thigh and into the right groin  Quality: Sharp  Timing: Constant  Exacerbating Factors: Sitting, going from sit to stand, walking for prolonged periods  Alleviating Factors: laying down.  Review of Symptoms: Denies weakness in leg(s) bilateral, Denies numbness in leg(s) bilateral, Denies saddle anesthesia, Denies night sweats, Denies fevers, (+) changes in gait, (+) pain waking at night, (+) history of pancreatic cancer s/p whipple, Denies unexplained weight loss, Denies changes in bowel function, Denies changes in bladder function.    Patient has failed > 4 weeks of conservative treatment including: Activity modification (avoiding exacerbating factors), chiropractic care, and oral medications.       Pain Severity Scores:   Currently: 5/10      Worst Pain: 9/10     Least Pain: 5/10  Average Pain: 5/10    Pain Disability Index  Family/Home Responsibilities:: 5  Recreation:: 4  Social Activity:: 4  Occupation:: 4  Sexual Behavior:: 0  Self Care:: 0  Life-Support Activities:: 0  Pain Disability Index (PDI): 17    Opioid Risk Score         Value Time User    Opioid Risk Score  4 6/10/2025  9:45 AM Corbin Sunshine MA             Prior Diagnostic Evaluation: MRI    Previous  "Therapies/Treatments:  Activity Modification (rest, avoiding aggravating factors, etc.): Yes - Helpful  Heat (heating pads, etc.):   Cold (ice packs):   Physician guided home exercise program:   PT/OT:   Chiropractor: Yes - Helpful  Acupuncture:   Massage:   Bracing:   TENS unit:     Previous Medications:   - NSAIDS: tylenol not effective  - Muscle Relaxants: tizanidine not effective  - TCAs:   - SNRIs/Antidepressants for Pain:   - Topicals:   - Anticonvulsants:   - Opioids:     Relevant Surgery: yes    - Cervical fusion in [place], [place] in the late 80's    Previous Interventions for Pain:  -       Current Pain Medications:  Tylenol OTC prn      Blood Thinners: ASA 81 mg     Full Medication List:  Current Medications[1]     Review of Systems: See HPI    Allergies:  Adhesive tape-silicones and Nitroglycerin     Medical History:   has a past medical history of AMD (age related macular degeneration).    Surgical History:   has a past surgical history that includes Hernia repair (1969); Cholecystectomy (1990 ?); Vasectomy (1971); Small intestine surgery (2004 & 2012); Spine surgery (2003 & 2005); Prostate surgery (2018); and Eye surgery (???).    Social History:   reports that he has never smoked. He has never been exposed to tobacco smoke. He has never used smokeless tobacco. He reports that he does not drink alcohol and does not use drugs.    Family History:  family history includes Alcohol abuse in his brother; Arthritis in his mother; COPD in his mother; Cancer in his brother and father; Early death in his brother; Heart disease in his father and mother; Hypertension in his sister; Stroke in his sister; Vision loss in his mother and sister.      Physical Exam:  /73   Pulse 71   Ht 5' 10" (1.778 m)   Wt 69.8 kg (153 lb 14.1 oz)   BMI 22.08 kg/m²   GEN: No acute distress. Calm, comfortable  HENT: Normocephalic, atraumatic, moist mucous membranes  EYE: Anicteric sclera, non-injected.   CV: Non-diaphoretic. " Regular Rate. Radial Pulses 2+.  RESP: Breathing comfortably. Chest expansion symmetric.  EXT: No clubbing, cyanosis.   SKIN: Warm, & dry to palpation. No visible rashes or lesions of exposed skin.   PSYCH: Pleasant mood and appropriate affect. Recent and remote memory intact.   GAIT: Independent, antalgic ambulation  Lumbar Spine Exam:       Inspection: No erythema, bruising. No Lateral shift.       Palpation:   - (+) TTP of lumbar paraspinals bilaterally.  - (+) TTP of sacroiliac joint bilaterally.  - (-) TTP of mildline spinous processes       ROM: (+) Limited in flexion. (+) limited in extension, (+) limitation in lateral bending bilateral.    Directional Preference:       Provocative Maneuvers:  (+) Facet loading bilaterally  (-) Straight Leg Raise b/l, but hip pain reproduced on the right  (+) LIZETH on the right  Hip Exam:      Inspection: No gross deformity or apparent leg length discrepancy      Palpation: (+) TTP to greater trochanteric bursa(s) bilaterally, worse on the right      ROM: (+) limitation in internal rotation right, no limitation in external rotation bilateral  Neurologic Exam:     Alert. Speech is fluent and appropriate.     Strength: 4/5 in right hip flexion, 5/5 throughout bilateral lower extremities     Sensation:  Grossly intact to light touch in bilateral lower extremities     Reflexes: 2+ in left and 1+ in right patella, 2+ in b/l achilles     Tone: No abnormality appreciated in bilateral lower extremities     No Clonus      Imaging:  - X-ray hips b/l 6/10/25:  No fracture. Joint alignment is anatomic. There is no radiographic evidence of femoral head osteonecrosis. Joint spaces appear relatively well-preserved. Small calcification of the left superior labrum. Atherosclerotic calcifications. Pelvic surgical clips.     - MRI Lumbar w/o 6/9/25:   There is a mild levo scoliotic curvature centered at L4 which is unchanged.  There is normal sagittal alignment.  There is no spondylolisthesis.   There is no abnormal marrow edema.  There is diffuse disc desiccation.  The cord terminates at the level of the L1-L2 intervertebral disc space.  The visualized lower cord is normal in signal and morphology.  There is no bony mass lesion.     L1-L2: The central canal and bilateral neural foramina are widely patent.     L2-L3: There is a small circumferential broad-based disc bulge with mild bilateral degenerative facet arthropathy.  The central canal is widely patent.  There is mild narrowing of the bilateral neural foramina.     L3-L4: There is a moderately sized circumferential broad-based disc bulge with ligamentum flavum hypertrophy and degenerative facet arthropathy.  There is a small amount of fluid in the left greater than right facets.  There is moderate narrowing of the central canal with moderate right and mild left neural foraminal stenosis.     L4-L5: There is a moderately sized circumferential broad-based disc bulge resulting in mild narrowing of the central canal.  There is moderate bilateral facet arthropathy resulting in mild narrowing of the left and moderate right neural foraminal stenosis.     L5-S1: There is a small circumferential broad-based disc bulge with a superimposed left paracentral disc protrusion.  There is right greater than left degenerative facet arthropathy.  The central canal remains widely patent.  There is moderate narrowing of the left and mild right neural foraminal stenosis.     The paraspinal musculature is normal.  The aorta is nonaneurysmal.  There are simple renal cysts bilaterally.      - X-ray lumbar sp[ine 5/14/25:  There are 5 non rib-bearing lumbar type vertebra. There is mild lumbar levoscoliosis with apex at L3-4. The posterior lumbar alignment is maintained. Vertebral heights are maintained no acute fracture seen. There is mild-to-moderate multilevel disc space narrowing, vertebral spurs, and facet arthropathy. Vascular calcifications noted.       Labs:  BMP  Lab  Results   Component Value Date     2025    K 4.1 2025     2025    CO2 28 2025    BUN 19 2025    CREATININE 0.75 2025    CALCIUM 9.0 2025    EGFRNORACEVR 89 2025     Lab Results   Component Value Date    ALT 57 (H) 2025    AST 36 2025    ALKPHOS 125 2025    BILITOT 0.5 2025     Lab Results   Component Value Date     2025     Lab Results   Component Value Date    HGBA1C 6.2 (H) 2025         Assessment:  Alissa Najera is a 84 y.o. male with the following diagnoses based on history, exam, and imagin. Lumbar radiculopathy  -     Ambulatory referral/consult to Pain Clinic  -     Ambulatory Referral/Consult to Physical Therapy; Future; Expected date: 2025    2. Decreased range of right hip movement  -     X-Ray Hips Bilateral 2 View Incl AP Pelvis; Future; Expected date: 06/10/2025    3. Vertebrogenic low back pain  -     Ambulatory Referral/Consult to Physical Therapy; Future; Expected date: 2025    4. Trochanteric bursitis of both hips  -     X-Ray Hips Bilateral 2 View Incl AP Pelvis; Future; Expected date: 06/10/2025  -     Ambulatory Referral/Consult to Physical Therapy; Future; Expected date: 2025  -     Large Joint Aspiration/Injection: R greater trochanteric bursa    5. Lumbar spondylosis    6. Sacroiliac joint dysfunction    7. Chronic bilateral low back pain with right-sided sciatica        This is a pleasant 84 y.o. gentleman presenting with:     - Chronic low back pain with more acute right leg pain:    - MRI w/ mod stenosis on the right at L3-4, L4-5, diffuse facet arthropathy w/ effusion at L3-4, schmorls node at L4-5 w/ surrounding modic 1-2 changes.    - SIJ pain on exam as well as facet pain.   - Bilateral troch bursitis (worse on the right)  - Right hip ROM deficit but minimal OA on x-ray.   - Comorbidities: Chronic ASA. h/o pancreatic ca s/p whipple. Depression. HTN. IFG.      Treatment Plan:   - PT/OT/HEP: Refer to PT for hip pain. Discussed benefits of exercise for pain.   - Procedures: Performed right GTB CSI w/ US guidance   - Consider right L3 & L4 TF MATT if no relief with conservative measures   - Consider right vs bilateral SIJ CSI   - Consider right vs bilateral L3-4, L4-5 diagnostic MBBs   - Consider L4-5 intracept  - Medications: No changes recommended at this time.  - Imaging: Reviewed.   - X-ray hips b/l w/o significant hip OA  - Consider right hip MRI if not improving with above  - Labs: Reviewed.  Medications are appropriately dosed for current hepatorenal function.    Follow Up: RTC in 4 weeks or sooner PRN    Shweta Cristobal MD  Interventional Pain Medicine          [1]   Current Outpatient Medications:     amLODIPine (NORVASC) 5 MG tablet, Take 1 tablet by mouth once daily., Disp: , Rfl:     aspirin 81 MG Chew, Take 81 mg by mouth once daily., Disp: , Rfl:     atorvastatin (LIPITOR) 40 MG tablet, TAKE 1 TABLET BY MOUTH EVERY DAY, Disp: 90 tablet, Rfl: 1    ergocalciferol (ERGOCALCIFEROL) 50,000 unit Cap, Take 1 capsule (50,000 Units total) by mouth twice a week., Disp: 8 capsule, Rfl: 11    famotidine (PEPCID) 10 MG tablet, Take 10 mg by mouth as needed., Disp: , Rfl:     isosorbide mononitrate (IMDUR) 30 MG 24 hr tablet, Take 1 tablet (30 mg total) by mouth 2 (two) times a day., Disp: 180 tablet, Rfl: 3    lipase-protease-amylase 12,000-38,000-60,000 units (CREON) CpDR, Take 3 capsules by mouth 4 (four) times daily., Disp: 1080 capsule, Rfl: 3    metoprolol succinate (TOPROL-XL) 50 MG 24 hr tablet, Take 1 tablet by mouth once daily. 1 1/2tab at bedtime, Disp: , Rfl:     nitroGLYCERIN (NITROSTAT) 0.4 MG SL tablet, Place 0.4 mg under the tongue every 5 (five) minutes as needed., Disp: , Rfl:     pantoprazole (PROTONIX) 40 MG tablet, TAKE 1 TABLET BY MOUTH EVERY DAY BEFORE BREAKFAST, Disp: 90 tablet, Rfl: 3    RESTASIS 0.05 % ophthalmic emulsion, Place 1 drop into both  eyes 2 (two) times daily., Disp: , Rfl:     venlafaxine (EFFEXOR-XR) 150 MG Cp24, TAKE 1 CAPSULE BY MOUTH EVERY DAY, Disp: 90 capsule, Rfl: 3    vit C/E/Zn/coppr/lutein/zeaxan (PRESERVISION AREDS-2 ORAL), Take by mouth., Disp: , Rfl:     zolpidem (AMBIEN) 10 mg Tab, TAKE 1 TABLET BY MOUTH EVERY DAY IN THE EVENING, Disp: 90 tablet, Rfl: 1    predniSONE (DELTASONE) 20 MG tablet, Take 1 tablet (20 mg total) by mouth once daily for 7 days, THEN 0.5 tablets (10 mg total) once daily for 7 days. (Patient not taking: Reported on 6/10/2025), Disp: 11 tablet, Rfl: 0

## 2025-06-11 ENCOUNTER — OFFICE VISIT (OUTPATIENT)
Dept: FAMILY MEDICINE | Facility: CLINIC | Age: 85
End: 2025-06-11
Payer: MEDICARE

## 2025-06-11 VITALS
HEART RATE: 66 BPM | TEMPERATURE: 97 F | OXYGEN SATURATION: 96 % | DIASTOLIC BLOOD PRESSURE: 64 MMHG | HEIGHT: 70 IN | WEIGHT: 156.63 LBS | BODY MASS INDEX: 22.42 KG/M2 | SYSTOLIC BLOOD PRESSURE: 118 MMHG

## 2025-06-11 DIAGNOSIS — E78.5 HYPERLIPIDEMIA, UNSPECIFIED HYPERLIPIDEMIA TYPE: ICD-10-CM

## 2025-06-11 DIAGNOSIS — I25.119 ATHEROSCLEROSIS OF NATIVE CORONARY ARTERY OF NATIVE HEART WITH ANGINA PECTORIS: ICD-10-CM

## 2025-06-11 DIAGNOSIS — E46: ICD-10-CM

## 2025-06-11 DIAGNOSIS — K86.89 PANCREATIC INSUFFICIENCY: ICD-10-CM

## 2025-06-11 DIAGNOSIS — R73.01 IMPAIRED FASTING GLUCOSE: ICD-10-CM

## 2025-06-11 DIAGNOSIS — Z00.00 MEDICARE ANNUAL WELLNESS VISIT, SUBSEQUENT: Primary | ICD-10-CM

## 2025-06-11 DIAGNOSIS — I10 PRIMARY HYPERTENSION: ICD-10-CM

## 2025-06-11 DIAGNOSIS — M48.061 SPINAL STENOSIS OF LUMBAR REGION WITHOUT NEUROGENIC CLAUDICATION: ICD-10-CM

## 2025-06-11 NOTE — PROGRESS NOTES
SUBJECTIVE:  Alissa Najera is a 84 y.o. male here for Medicare AWV (Review labs)      HPI  Patient here for annual Medicare wellness and follow-up on chronic medical conditions.  See assessment and plan for individual list of issues.  Recent problems he has been dealing with include lumbar disc disease with radicular pain.  He also has some chronic angina  Ray's allergies, medications, history, and problem list were updated as appropriate.    Review of Systems   Constitutional:  Negative for activity change, appetite change, fatigue and fever.   HENT:  Negative for congestion, ear pain, hearing loss, sore throat and trouble swallowing.    Eyes:  Negative for photophobia, pain, redness and visual disturbance.   Respiratory:  Negative for cough, chest tightness, shortness of breath and wheezing.    Cardiovascular:  Positive for chest pain. Negative for palpitations and leg swelling.   Gastrointestinal:  Negative for abdominal distention, abdominal pain and blood in stool.   Endocrine: Negative for cold intolerance, heat intolerance, polydipsia and polyuria.   Genitourinary:  Negative for difficulty urinating, dysuria and frequency.   Musculoskeletal:  Positive for arthralgias, back pain, myalgias and neck pain. Negative for gait problem and joint swelling.   Skin:  Negative for color change, pallor and rash.   Allergic/Immunologic: Negative.    Neurological:  Negative for dizziness, seizures, speech difficulty, weakness and headaches.   Hematological:  Negative for adenopathy. Does not bruise/bleed easily.   Psychiatric/Behavioral:  Negative for agitation and confusion.       A comprehensive review of symptoms was completed and negative except as noted above.    Recent Results (from the past 3 weeks)   Comprehensive Metabolic Panel    Collection Time: 06/02/25  8:06 AM   Result Value Ref Range    Sodium 140 136 - 145 mmol/L    Potassium 4.1 3.5 - 5.1 mmol/L    Chloride 108 98 - 110 mmol/L    CO2 28 21 - 32 mmol/L     Glucose 94 70 - 115 mg/dL    Blood Urea Nitrogen 19 7.0 - 20.0 mg/dL    Creatinine 0.75 0.66 - 1.25 mg/dL    Calcium 9.0 8.4 - 10.2 mg/dL    Protein Total 6.2 (L) 6.3 - 8.2 gm/dL    Albumin 3.9 3.4 - 5.0 g/dL    Globulin 2.3 2.0 - 3.9 gm/dL    Albumin/Globulin Ratio 1.7 ratio    Bilirubin Total 0.5 0.0 - 1.0 mg/dL     50 - 144 unit/L    ALT 57 (H) 1 - 45 unit/L    AST 36 17 - 59 unit/L    eGFR 89 mL/min/1.73/m2    Anion Gap 4.0 2.0 - 13.0 mEq/L    BUN/Creatinine Ratio 25 (H) 12 - 20   Lipid Panel    Collection Time: 06/02/25  8:06 AM   Result Value Ref Range    Cholesterol Total 179 0 - 200 mg/dL    HDL Cholesterol 64 (H) 40 - 60 mg/dL    Triglyceride 151 30 - 200 mg/dL    LDL Cholesterol Direct 74.4 30.0 - 100.0 mg/dL   TSH    Collection Time: 06/02/25  8:06 AM   Result Value Ref Range    TSH 2.250 0.360 - 3.740 uIU/mL   Hemoglobin A1C    Collection Time: 06/02/25  8:06 AM   Result Value Ref Range    Hemoglobin A1c 6.2 (H) 4.0 - 6.0 %    Estimated Average Glucose 131.2 (H) 70.0 - 115.0 mg/dL   CBC with Differential    Collection Time: 06/02/25  8:06 AM   Result Value Ref Range    WBC 9.67 4.00 - 11.50 x10(3)/mcL    RBC 4.86 4.00 - 6.00 x10(6)/mcL    Hgb 13.9 13.0 - 18.0 g/dL    Hct 41.3 36.0 - 52.0 %    MCV 85.0 79.0 - 99.0 fL    MCH 28.6 27.0 - 34.0 pg    MCHC 33.7 31.0 - 37.0 g/dL    RDW 13.8 %    Platelet 262 140 - 371 x10(3)/mcL    MPV 9.3 (L) 9.4 - 12.4 fL    Neut % 68.1 37 - 73 %    Lymph % 20.2 20 - 55 %    Mono % 9.4 4.7 - 12.5 %    Eos % 1.1 0.7 - 7 %    Basophil % 0.4 0.1 - 1.2 %    Lymph # 1.95 1.32 - 3.57 x10(3)/mcL    Neut # 6.58 (H) 1.78 - 5.38 x10(3)/mcL    Mono # 0.91 (H) 0.3 - 0.82 x10(3)/mcL    Eos # 0.11 0.04 - 0.54 x10(3)/mcL    Baso # 0.04 0.01 - 0.08 x10(3)/mcL    Imm Gran # 0.08 (H) 0.00 - 0.03 x10(3)/mcL    Imm Grans % 0.8 (H) 0 - 0.5 %    NRBC% 0.0 <=1 %       OBJECTIVE:  Vital signs  Vitals:    06/11/25 1401   BP: 118/64   BP Location: Left arm   Patient Position: Sitting  "  Pulse: 66   Temp: 97 °F (36.1 °C)   TempSrc: Oral   SpO2: 96%   Weight: 71 kg (156 lb 9.6 oz)   Height: 5' 9.69" (1.77 m)        Physical Exam  Vitals and nursing note reviewed.   Constitutional:       General: He is not in acute distress.     Appearance: Normal appearance. He is not ill-appearing.   HENT:      Head: Normocephalic and atraumatic.      Right Ear: External ear normal.      Left Ear: External ear normal.      Nose: Nose normal.      Mouth/Throat:      Mouth: Mucous membranes are moist.      Pharynx: Oropharynx is clear.   Eyes:      Extraocular Movements: Extraocular movements intact.      Conjunctiva/sclera: Conjunctivae normal.   Cardiovascular:      Rate and Rhythm: Normal rate and regular rhythm.      Heart sounds: Normal heart sounds. No murmur heard.  Pulmonary:      Effort: Pulmonary effort is normal.      Breath sounds: Normal breath sounds. No wheezing or rhonchi.   Abdominal:      General: Abdomen is flat. There is no distension.      Palpations: Abdomen is soft.      Tenderness: There is no abdominal tenderness.   Musculoskeletal:         General: No swelling or deformity. Normal range of motion.      Cervical back: Normal range of motion and neck supple.   Skin:     General: Skin is warm and dry.      Findings: No bruising or rash.   Neurological:      General: No focal deficit present.      Mental Status: He is alert and oriented to person, place, and time.      Cranial Nerves: No cranial nerve deficit.      Motor: No weakness.      Gait: Gait abnormal.   Psychiatric:         Mood and Affect: Mood normal.         Behavior: Behavior normal.         Thought Content: Thought content normal.          ASSESSMENT/PLAN:  1. Medicare annual wellness visit, subsequent  I offered to discuss advanced care planning,  and how to pick a person who would make decisions for you if you were unable to make them for herself, called a health care power of , and what kind of decisions you might make " such as use of life-sustaining treatments such as ventilators and tube feeding when faced with a life-limiting illness recorded on a living will that they will need to know.( How to be cared for as you near the end of your natural life)    Patient is interested in learning more about how to make advance directives.  I provided them paperwork and offered to discuss this with them.  He is still once all medical procedures to extend life    No longer requires cancer screening based on age and expected longevity    Other medical providers include Cardiology, Dermatology, physiatry    2. Spinal stenosis of lumbar region without neurogenic claudication  Doing some physical therapy now and we will be getting some injections, overall pain has a little better    3. Primary hypertension  Blood pressure well controlled  Overview:  On amlodipine and metoprolol      4. Hyperlipidemia, unspecified hyperlipidemia type  On statin therapy  Overview:  Lab Results   Component Value Date    CHOL 179 06/02/2025    HDL 64 (H) 06/02/2025    LDLDIRECT 74.4 06/02/2025    TRIG 151 06/02/2025           5. Atherosclerosis of native coronary artery of native heart with angina pectoris  On high-dose statin.  He is also on nitroglycerin  Overview:  On aspirin statin and nitroglycerin as needed      6. Impaired fasting glucose  Stable  Overview:  Lab Results   Component Value Date    HGBA1C 6.2 (H) 06/02/2025    HGBA1C 5.9 03/15/2024    HGBA1C 5.8 (H) 05/10/2023           7. Protein deficiency disease  Continue to try to supplement with protein.  He has a history of a Whipple procedure years ago    8. Pancreatic insufficiency  Pancreatic enzyme replacement         Follow Up:  Follow up in about 6 months (around 12/11/2025) for recheck, fasting labs.

## 2025-06-13 ENCOUNTER — TELEPHONE (OUTPATIENT)
Dept: PAIN MEDICINE | Facility: CLINIC | Age: 85
End: 2025-06-13
Payer: MEDICARE

## 2025-06-13 NOTE — TELEPHONE ENCOUNTER
Copied from CRM #[id number]. Topic: General Inquiry - Patient Advice  >> Jun 12, 2025  9:40 AM Sylwia wrote:  Type:  Needs Medical Advice    Who Called: pt  Symptoms (please be specific): na   How long has patient had these symptoms:  na  Pharmacy name and phone #:  na  Would the patient rather a call back or a response via [facility]? call  Best Call Back Number: [phone]  Additional Information: never received orders for physical therapy at desired facility.   Fax: [phone]  Phone: [phone]

## 2025-06-19 DIAGNOSIS — F32.A DEPRESSIVE DISORDER: ICD-10-CM

## 2025-06-19 RX ORDER — VENLAFAXINE HYDROCHLORIDE 150 MG/1
150 CAPSULE, EXTENDED RELEASE ORAL
Qty: 90 CAPSULE | Refills: 3 | Status: SHIPPED | OUTPATIENT
Start: 2025-06-19

## 2025-06-23 DIAGNOSIS — K86.89 PANCREATIC INSUFFICIENCY: ICD-10-CM

## 2025-06-23 RX ORDER — PANCRELIPASE 60000; 12000; 38000 [USP'U]/1; [USP'U]/1; [USP'U]/1
CAPSULE, DELAYED RELEASE PELLETS ORAL
Qty: 1080 CAPSULE | Refills: 3 | Status: SHIPPED | OUTPATIENT
Start: 2025-06-23

## 2025-08-01 ENCOUNTER — OFFICE VISIT (OUTPATIENT)
Dept: PAIN MEDICINE | Facility: CLINIC | Age: 85
End: 2025-08-01
Payer: MEDICARE

## 2025-08-01 VITALS
HEART RATE: 71 BPM | OXYGEN SATURATION: 99 % | DIASTOLIC BLOOD PRESSURE: 76 MMHG | HEIGHT: 69 IN | BODY MASS INDEX: 22.94 KG/M2 | SYSTOLIC BLOOD PRESSURE: 144 MMHG | WEIGHT: 154.88 LBS

## 2025-08-01 DIAGNOSIS — G89.29 CHRONIC HIP PAIN, RIGHT: Primary | ICD-10-CM

## 2025-08-01 DIAGNOSIS — M25.651 DECREASED RANGE OF RIGHT HIP MOVEMENT: ICD-10-CM

## 2025-08-01 DIAGNOSIS — M53.3 SACROILIAC JOINT DYSFUNCTION: ICD-10-CM

## 2025-08-01 DIAGNOSIS — M47.816 LUMBAR SPONDYLOSIS: ICD-10-CM

## 2025-08-01 DIAGNOSIS — M70.61 TROCHANTERIC BURSITIS OF BOTH HIPS: ICD-10-CM

## 2025-08-01 DIAGNOSIS — M54.51 VERTEBROGENIC LOW BACK PAIN: ICD-10-CM

## 2025-08-01 DIAGNOSIS — M25.551 CHRONIC HIP PAIN, RIGHT: Primary | ICD-10-CM

## 2025-08-01 DIAGNOSIS — M70.62 TROCHANTERIC BURSITIS OF BOTH HIPS: ICD-10-CM

## 2025-08-01 DIAGNOSIS — M54.16 LUMBAR RADICULOPATHY: ICD-10-CM

## 2025-08-01 NOTE — Clinical Note
Order placed from MRI of right Hip. He would like this done at [facility] in [place].  Thanks, Fidelina Park PA-C

## 2025-08-06 ENCOUNTER — TELEPHONE (OUTPATIENT)
Dept: PAIN MEDICINE | Facility: CLINIC | Age: 85
End: 2025-08-06
Payer: MEDICARE

## 2025-08-06 NOTE — TELEPHONE ENCOUNTER
----- Message from Olga sent at 8/1/2025  4:38 PM CDT -----  Regarding: PA  MRI Approved Auth# [id number] for dates 08/01 to 09/30/2025

## 2025-08-07 ENCOUNTER — TELEPHONE (OUTPATIENT)
Dept: PAIN MEDICINE | Facility: CLINIC | Age: 85
End: 2025-08-07
Payer: MEDICARE

## 2025-08-07 NOTE — TELEPHONE ENCOUNTER
----- Message from Jeniffer Park PA-C sent at 8/1/2025  4:26 PM CDT -----  Order placed from MRI of right Hip. He would like this done at [facility] in [place].   Thanks,  Fidelina Park PA-C

## 2025-08-12 ENCOUNTER — HOSPITAL ENCOUNTER (OUTPATIENT)
Dept: RADIOLOGY | Facility: HOSPITAL | Age: 85
Discharge: HOME | End: 2025-08-12
Attending: PHYSICIAN ASSISTANT
Payer: MEDICARE

## 2025-08-12 PROCEDURE — 73721 MRI JNT OF LWR EXTRE W/O DYE: CPT | Mod: TC,RT

## 2025-08-28 DIAGNOSIS — G47.00 INSOMNIA, UNSPECIFIED TYPE: ICD-10-CM

## 2025-08-28 RX ORDER — ZOLPIDEM TARTRATE 10 MG/1
10 TABLET ORAL NIGHTLY
Qty: 90 TABLET | Refills: 1 | Status: SHIPPED | OUTPATIENT
Start: 2025-08-28